# Patient Record
Sex: FEMALE | Race: WHITE
[De-identification: names, ages, dates, MRNs, and addresses within clinical notes are randomized per-mention and may not be internally consistent; named-entity substitution may affect disease eponyms.]

---

## 2022-12-22 PROBLEM — Z00.00 ENCOUNTER FOR PREVENTIVE HEALTH EXAMINATION: Status: ACTIVE | Noted: 2022-12-22

## 2024-08-23 ENCOUNTER — APPOINTMENT (OUTPATIENT)
Age: 79
End: 2024-08-23
Payer: COMMERCIAL

## 2024-08-23 ENCOUNTER — NON-APPOINTMENT (OUTPATIENT)
Age: 79
End: 2024-08-23

## 2024-08-23 PROCEDURE — 92136 OPHTHALMIC BIOMETRY: CPT

## 2024-08-23 PROCEDURE — 92002 INTRM OPH EXAM NEW PATIENT: CPT

## 2024-11-05 ENCOUNTER — NON-APPOINTMENT (OUTPATIENT)
Age: 79
End: 2024-11-05

## 2024-11-05 ENCOUNTER — APPOINTMENT (OUTPATIENT)
Age: 79
End: 2024-11-05

## 2024-11-05 PROCEDURE — 66984 XCAPSL CTRC RMVL W/O ECP: CPT | Mod: LT

## 2024-11-06 ENCOUNTER — NON-APPOINTMENT (OUTPATIENT)
Age: 79
End: 2024-11-06

## 2024-11-06 ENCOUNTER — APPOINTMENT (OUTPATIENT)
Age: 79
End: 2024-11-06
Payer: COMMERCIAL

## 2024-11-06 PROCEDURE — 99024 POSTOP FOLLOW-UP VISIT: CPT

## 2024-11-12 ENCOUNTER — APPOINTMENT (OUTPATIENT)
Age: 79
End: 2024-11-12

## 2024-11-12 ENCOUNTER — APPOINTMENT (OUTPATIENT)
Age: 79
End: 2024-11-12
Payer: COMMERCIAL

## 2024-11-12 ENCOUNTER — NON-APPOINTMENT (OUTPATIENT)
Age: 79
End: 2024-11-12

## 2024-11-12 PROCEDURE — 99024 POSTOP FOLLOW-UP VISIT: CPT

## 2024-11-13 ENCOUNTER — NON-APPOINTMENT (OUTPATIENT)
Age: 79
End: 2024-11-13

## 2024-11-13 ENCOUNTER — APPOINTMENT (OUTPATIENT)
Age: 79
End: 2024-11-13
Payer: COMMERCIAL

## 2024-11-13 PROCEDURE — 99024 POSTOP FOLLOW-UP VISIT: CPT

## 2024-11-14 ENCOUNTER — APPOINTMENT (OUTPATIENT)
Age: 79
End: 2024-11-14
Payer: COMMERCIAL

## 2024-11-14 ENCOUNTER — INPATIENT (INPATIENT)
Facility: HOSPITAL | Age: 79
LOS: 22 days | Discharge: HOME HEALTH | End: 2024-12-07
Attending: HOSPITALIST | Admitting: HOSPITALIST
Payer: COMMERCIAL

## 2024-11-14 ENCOUNTER — APPOINTMENT (OUTPATIENT)
Dept: OPHTHALMOLOGY | Facility: CLINIC | Age: 79
End: 2024-11-14
Payer: COMMERCIAL

## 2024-11-14 ENCOUNTER — APPOINTMENT (OUTPATIENT)
Age: 79
End: 2024-11-14

## 2024-11-14 ENCOUNTER — NON-APPOINTMENT (OUTPATIENT)
Age: 79
End: 2024-11-14

## 2024-11-14 VITALS
HEIGHT: 56 IN | RESPIRATION RATE: 17 BRPM | WEIGHT: 160.06 LBS | DIASTOLIC BLOOD PRESSURE: 82 MMHG | SYSTOLIC BLOOD PRESSURE: 188 MMHG | TEMPERATURE: 99 F | OXYGEN SATURATION: 96 % | HEART RATE: 57 BPM

## 2024-11-14 DIAGNOSIS — Z98.49 CATARACT EXTRACTION STATUS, UNSPECIFIED EYE: Chronic | ICD-10-CM

## 2024-11-14 DIAGNOSIS — H44.002 UNSPECIFIED PURULENT ENDOPHTHALMITIS, LEFT EYE: ICD-10-CM

## 2024-11-14 DIAGNOSIS — T81.40XA INFECTION FOLLOWING A PROCEDURE, UNSPECIFIED, INITIAL ENCOUNTER: ICD-10-CM

## 2024-11-14 DIAGNOSIS — Z29.9 ENCOUNTER FOR PROPHYLACTIC MEASURES, UNSPECIFIED: ICD-10-CM

## 2024-11-14 LAB
ANION GAP SERPL CALC-SCNC: 16 MMOL/L — HIGH (ref 7–14)
APTT BLD: 31.9 SEC — SIGNIFICANT CHANGE UP (ref 24.5–35.6)
BASOPHILS # BLD AUTO: 0.05 K/UL — SIGNIFICANT CHANGE UP (ref 0–0.2)
BASOPHILS NFR BLD AUTO: 0.3 % — SIGNIFICANT CHANGE UP (ref 0–2)
BLD GP AB SCN SERPL QL: NEGATIVE — SIGNIFICANT CHANGE UP
BUN SERPL-MCNC: 21 MG/DL — SIGNIFICANT CHANGE UP (ref 7–23)
CALCIUM SERPL-MCNC: 9.3 MG/DL — SIGNIFICANT CHANGE UP (ref 8.4–10.5)
CHLORIDE SERPL-SCNC: 101 MMOL/L — SIGNIFICANT CHANGE UP (ref 98–107)
CO2 SERPL-SCNC: 23 MMOL/L — SIGNIFICANT CHANGE UP (ref 22–31)
CREAT SERPL-MCNC: 0.81 MG/DL — SIGNIFICANT CHANGE UP (ref 0.5–1.3)
EGFR: 74 ML/MIN/1.73M2 — SIGNIFICANT CHANGE UP
EGFR: 74 ML/MIN/1.73M2 — SIGNIFICANT CHANGE UP
EOSINOPHIL # BLD AUTO: 0 K/UL — SIGNIFICANT CHANGE UP (ref 0–0.5)
EOSINOPHIL NFR BLD AUTO: 0 % — SIGNIFICANT CHANGE UP (ref 0–6)
GLUCOSE SERPL-MCNC: 173 MG/DL — HIGH (ref 70–99)
HCT VFR BLD CALC: 41.9 % — SIGNIFICANT CHANGE UP (ref 34.5–45)
HGB BLD-MCNC: 14 G/DL — SIGNIFICANT CHANGE UP (ref 11.5–15.5)
IANC: 12.62 K/UL — HIGH (ref 1.8–7.4)
IMM GRANULOCYTES NFR BLD AUTO: 0.4 % — SIGNIFICANT CHANGE UP (ref 0–0.9)
INR BLD: 1.1 RATIO — SIGNIFICANT CHANGE UP (ref 0.85–1.16)
LYMPHOCYTES # BLD AUTO: 1.34 K/UL — SIGNIFICANT CHANGE UP (ref 1–3.3)
LYMPHOCYTES # BLD AUTO: 8.9 % — LOW (ref 13–44)
MAGNESIUM SERPL-MCNC: 2.2 MG/DL — SIGNIFICANT CHANGE UP (ref 1.6–2.6)
MCHC RBC-ENTMCNC: 30.3 PG — SIGNIFICANT CHANGE UP (ref 27–34)
MCHC RBC-ENTMCNC: 33.4 G/DL — SIGNIFICANT CHANGE UP (ref 32–36)
MCV RBC AUTO: 90.7 FL — SIGNIFICANT CHANGE UP (ref 80–100)
MONOCYTES # BLD AUTO: 1.04 K/UL — HIGH (ref 0–0.9)
MONOCYTES NFR BLD AUTO: 6.9 % — SIGNIFICANT CHANGE UP (ref 2–14)
NEUTROPHILS # BLD AUTO: 12.62 K/UL — HIGH (ref 1.8–7.4)
NEUTROPHILS NFR BLD AUTO: 83.5 % — HIGH (ref 43–77)
NRBC # BLD AUTO: 0 K/UL — SIGNIFICANT CHANGE UP (ref 0–0)
NRBC # BLD: 0 /100 WBCS — SIGNIFICANT CHANGE UP (ref 0–0)
NRBC # FLD: 0 K/UL — SIGNIFICANT CHANGE UP (ref 0–0)
NRBC BLD-RTO: 0 /100 WBCS — SIGNIFICANT CHANGE UP (ref 0–0)
PHOSPHATE SERPL-MCNC: 2.6 MG/DL — SIGNIFICANT CHANGE UP (ref 2.5–4.5)
PLATELET # BLD AUTO: 240 K/UL — SIGNIFICANT CHANGE UP (ref 150–400)
POTASSIUM SERPL-MCNC: 3.9 MMOL/L — SIGNIFICANT CHANGE UP (ref 3.5–5.3)
POTASSIUM SERPL-SCNC: 3.9 MMOL/L — SIGNIFICANT CHANGE UP (ref 3.5–5.3)
PROTHROM AB SERPL-ACNC: 12.8 SEC — SIGNIFICANT CHANGE UP (ref 9.9–13.4)
RBC # BLD: 4.62 M/UL — SIGNIFICANT CHANGE UP (ref 3.8–5.2)
RBC # FLD: 13.2 % — SIGNIFICANT CHANGE UP (ref 10.3–14.5)
RH IG SCN BLD-IMP: POSITIVE — SIGNIFICANT CHANGE UP
SODIUM SERPL-SCNC: 140 MMOL/L — SIGNIFICANT CHANGE UP (ref 135–145)
WBC # BLD: 15.11 K/UL — HIGH (ref 3.8–10.5)
WBC # FLD AUTO: 15.11 K/UL — HIGH (ref 3.8–10.5)

## 2024-11-14 PROCEDURE — 99024 POSTOP FOLLOW-UP VISIT: CPT

## 2024-11-14 PROCEDURE — 92012 INTRM OPH EXAM EST PATIENT: CPT

## 2024-11-14 PROCEDURE — 99285 EMERGENCY DEPT VISIT HI MDM: CPT

## 2024-11-14 RX ORDER — SODIUM CHLORIDE 9 G/1000ML
1000 INJECTION, SOLUTION INTRAVENOUS
Refills: 0 | Status: DISCONTINUED | OUTPATIENT
Start: 2024-11-14 | End: 2024-12-07

## 2024-11-14 RX ORDER — INSULIN LISPRO 100 U/ML
INJECTION, SOLUTION INTRAVENOUS; SUBCUTANEOUS
Refills: 0 | Status: DISCONTINUED | OUTPATIENT
Start: 2024-11-14 | End: 2024-12-07

## 2024-11-14 RX ORDER — DEXTROSE 50 % IN WATER 50 %
25 SYRINGE (ML) INTRAVENOUS ONCE
Refills: 0 | Status: DISCONTINUED | OUTPATIENT
Start: 2024-11-14 | End: 2024-12-07

## 2024-11-14 RX ORDER — ACETAMINOPHEN 500 MG/5ML
1000 LIQUID (ML) ORAL ONCE
Refills: 0 | Status: COMPLETED | OUTPATIENT
Start: 2024-11-14 | End: 2024-11-14

## 2024-11-14 RX ORDER — DEXTROSE 50 % IN WATER 50 %
15 SYRINGE (ML) INTRAVENOUS ONCE
Refills: 0 | Status: DISCONTINUED | OUTPATIENT
Start: 2024-11-14 | End: 2024-12-07

## 2024-11-14 RX ORDER — VANCOMYCIN HCL IN 5 % DEXTROSE 1.5G/250ML
1 PLASTIC BAG, INJECTION (ML) INTRAVENOUS ONCE
Refills: 0 | Status: DISCONTINUED | OUTPATIENT
Start: 2024-11-14 | End: 2024-11-14

## 2024-11-14 RX ORDER — HEPARIN SODIUM 1000 [USP'U]/ML
5000 INJECTION INTRAVENOUS; SUBCUTANEOUS EVERY 12 HOURS
Refills: 0 | Status: DISCONTINUED | OUTPATIENT
Start: 2024-11-14 | End: 2024-11-18

## 2024-11-14 RX ORDER — VANCOMYCIN HCL IN 5 % DEXTROSE 1.5G/250ML
1 PLASTIC BAG, INJECTION (ML) INTRAVENOUS
Refills: 0 | Status: DISCONTINUED | OUTPATIENT
Start: 2024-11-14 | End: 2024-11-14

## 2024-11-14 RX ORDER — DEXTROSE 50 % IN WATER 50 %
12.5 SYRINGE (ML) INTRAVENOUS ONCE
Refills: 0 | Status: DISCONTINUED | OUTPATIENT
Start: 2024-11-14 | End: 2024-12-07

## 2024-11-14 RX ORDER — VANCOMYCIN HCL IN 5 % DEXTROSE 1.5G/250ML
1 PLASTIC BAG, INJECTION (ML) INTRAVENOUS
Refills: 0 | Status: DISCONTINUED | OUTPATIENT
Start: 2024-11-14 | End: 2024-11-17

## 2024-11-14 RX ORDER — INFLUENZA A VIRUS A/IDAHO/07/2018 (H1N1) ANTIGEN (MDCK CELL DERIVED, PROPIOLACTONE INACTIVATED, INFLUENZA A VIRUS A/INDIANA/08/2018 (H3N2) ANTIGEN (MDCK CELL DERIVED, PROPIOLACTONE INACTIVATED), INFLUENZA B VIRUS B/SINGAPORE/INFTT-16-0610/2016 ANTIGEN (MDCK CELL DERIVED, PROPIOLACTONE INACTIVATED), INFLUENZA B VIRUS B/IOWA/06/2017 ANTIGEN (MDCK CELL DERIVED, PROPIOLACTONE INACTIVATED) 15; 15; 15; 15 UG/.5ML; UG/.5ML; UG/.5ML; UG/.5ML
0.5 INJECTION, SUSPENSION INTRAMUSCULAR ONCE
Refills: 0 | Status: DISCONTINUED | OUTPATIENT
Start: 2024-11-14 | End: 2024-12-07

## 2024-11-14 RX ORDER — GLUCAGON 3 MG/1
1 POWDER NASAL ONCE
Refills: 0 | Status: DISCONTINUED | OUTPATIENT
Start: 2024-11-14 | End: 2024-12-07

## 2024-11-14 RX ORDER — INSULIN LISPRO 100 U/ML
INJECTION, SOLUTION INTRAVENOUS; SUBCUTANEOUS AT BEDTIME
Refills: 0 | Status: DISCONTINUED | OUTPATIENT
Start: 2024-11-14 | End: 2024-12-07

## 2024-11-14 RX ADMIN — Medication 400 MILLIGRAM(S): at 19:37

## 2024-11-14 RX ADMIN — Medication 1 DROP(S): at 23:08

## 2024-11-14 RX ADMIN — Medication 1 DROP(S): at 23:15

## 2024-11-15 DIAGNOSIS — Z98.890 OTHER SPECIFIED POSTPROCEDURAL STATES: Chronic | ICD-10-CM

## 2024-11-15 DIAGNOSIS — Z98.49 CATARACT EXTRACTION STATUS, UNSPECIFIED EYE: Chronic | ICD-10-CM

## 2024-11-15 DIAGNOSIS — Z98.891 HISTORY OF UTERINE SCAR FROM PREVIOUS SURGERY: Chronic | ICD-10-CM

## 2024-11-15 DIAGNOSIS — I10 ESSENTIAL (PRIMARY) HYPERTENSION: ICD-10-CM

## 2024-11-15 DIAGNOSIS — Z79.899 OTHER LONG TERM (CURRENT) DRUG THERAPY: ICD-10-CM

## 2024-11-15 DIAGNOSIS — E11.9 TYPE 2 DIABETES MELLITUS WITHOUT COMPLICATIONS: ICD-10-CM

## 2024-11-15 LAB
A1C WITH ESTIMATED AVERAGE GLUCOSE RESULT: 7.7 % — HIGH (ref 4–5.6)
ALBUMIN SERPL ELPH-MCNC: 3.8 G/DL — SIGNIFICANT CHANGE UP (ref 3.3–5)
ALP SERPL-CCNC: 74 U/L — SIGNIFICANT CHANGE UP (ref 40–120)
ALT FLD-CCNC: 12 U/L — SIGNIFICANT CHANGE UP (ref 4–33)
ANION GAP SERPL CALC-SCNC: 14 MMOL/L — SIGNIFICANT CHANGE UP (ref 7–14)
AST SERPL-CCNC: 13 U/L — SIGNIFICANT CHANGE UP (ref 4–32)
BASOPHILS # BLD AUTO: 0.05 K/UL — SIGNIFICANT CHANGE UP (ref 0–0.2)
BASOPHILS NFR BLD AUTO: 0.4 % — SIGNIFICANT CHANGE UP (ref 0–2)
BILIRUB SERPL-MCNC: 0.8 MG/DL — SIGNIFICANT CHANGE UP (ref 0.2–1.2)
BUN SERPL-MCNC: 21 MG/DL — SIGNIFICANT CHANGE UP (ref 7–23)
CALCIUM SERPL-MCNC: 9.1 MG/DL — SIGNIFICANT CHANGE UP (ref 8.4–10.5)
CHLORIDE SERPL-SCNC: 102 MMOL/L — SIGNIFICANT CHANGE UP (ref 98–107)
CO2 SERPL-SCNC: 21 MMOL/L — LOW (ref 22–31)
CREAT SERPL-MCNC: 0.76 MG/DL — SIGNIFICANT CHANGE UP (ref 0.5–1.3)
EGFR: 80 ML/MIN/1.73M2 — SIGNIFICANT CHANGE UP
EGFR: 80 ML/MIN/1.73M2 — SIGNIFICANT CHANGE UP
EOSINOPHIL # BLD AUTO: 0.01 K/UL — SIGNIFICANT CHANGE UP (ref 0–0.5)
EOSINOPHIL NFR BLD AUTO: 0.1 % — SIGNIFICANT CHANGE UP (ref 0–6)
ESTIMATED AVERAGE GLUCOSE: 174 — SIGNIFICANT CHANGE UP
GLUCOSE BLDC GLUCOMTR-MCNC: 141 MG/DL — HIGH (ref 70–99)
GLUCOSE BLDC GLUCOMTR-MCNC: 160 MG/DL — HIGH (ref 70–99)
GLUCOSE BLDC GLUCOMTR-MCNC: 181 MG/DL — HIGH (ref 70–99)
GLUCOSE SERPL-MCNC: 177 MG/DL — HIGH (ref 70–99)
HCT VFR BLD CALC: 39.9 % — SIGNIFICANT CHANGE UP (ref 34.5–45)
HGB BLD-MCNC: 13.2 G/DL — SIGNIFICANT CHANGE UP (ref 11.5–15.5)
IANC: 10.7 K/UL — HIGH (ref 1.8–7.4)
IMM GRANULOCYTES NFR BLD AUTO: 1.1 % — HIGH (ref 0–0.9)
LYMPHOCYTES # BLD AUTO: 1.39 K/UL — SIGNIFICANT CHANGE UP (ref 1–3.3)
LYMPHOCYTES # BLD AUTO: 10.4 % — LOW (ref 13–44)
MAGNESIUM SERPL-MCNC: 2.1 MG/DL — SIGNIFICANT CHANGE UP (ref 1.6–2.6)
MCHC RBC-ENTMCNC: 30.3 PG — SIGNIFICANT CHANGE UP (ref 27–34)
MCHC RBC-ENTMCNC: 33.1 G/DL — SIGNIFICANT CHANGE UP (ref 32–36)
MCV RBC AUTO: 91.7 FL — SIGNIFICANT CHANGE UP (ref 80–100)
MONOCYTES # BLD AUTO: 1.09 K/UL — HIGH (ref 0–0.9)
MONOCYTES NFR BLD AUTO: 8.1 % — SIGNIFICANT CHANGE UP (ref 2–14)
NEUTROPHILS # BLD AUTO: 10.7 K/UL — HIGH (ref 1.8–7.4)
NEUTROPHILS NFR BLD AUTO: 79.9 % — HIGH (ref 43–77)
NRBC # BLD AUTO: 0 K/UL — SIGNIFICANT CHANGE UP (ref 0–0)
NRBC # BLD: 0 /100 WBCS — SIGNIFICANT CHANGE UP (ref 0–0)
NRBC # FLD: 0 K/UL — SIGNIFICANT CHANGE UP (ref 0–0)
NRBC BLD-RTO: 0 /100 WBCS — SIGNIFICANT CHANGE UP (ref 0–0)
PHOSPHATE SERPL-MCNC: 2.9 MG/DL — SIGNIFICANT CHANGE UP (ref 2.5–4.5)
PLATELET # BLD AUTO: 209 K/UL — SIGNIFICANT CHANGE UP (ref 150–400)
POTASSIUM SERPL-MCNC: 3.7 MMOL/L — SIGNIFICANT CHANGE UP (ref 3.5–5.3)
POTASSIUM SERPL-SCNC: 3.7 MMOL/L — SIGNIFICANT CHANGE UP (ref 3.5–5.3)
PROT SERPL-MCNC: 6.8 G/DL — SIGNIFICANT CHANGE UP (ref 6–8.3)
RBC # BLD: 4.35 M/UL — SIGNIFICANT CHANGE UP (ref 3.8–5.2)
RBC # FLD: 13.2 % — SIGNIFICANT CHANGE UP (ref 10.3–14.5)
SODIUM SERPL-SCNC: 137 MMOL/L — SIGNIFICANT CHANGE UP (ref 135–145)
WBC # BLD: 13.39 K/UL — HIGH (ref 3.8–10.5)
WBC # FLD AUTO: 13.39 K/UL — HIGH (ref 3.8–10.5)

## 2024-11-15 RX ORDER — DOXYCYCLINE HYCLATE 100 MG
100 TABLET ORAL EVERY 12 HOURS
Refills: 0 | Status: DISCONTINUED | OUTPATIENT
Start: 2024-11-15 | End: 2024-12-07

## 2024-11-15 RX ORDER — LISINOPRIL 5 MG/1
20 TABLET ORAL DAILY
Refills: 0 | Status: DISCONTINUED | OUTPATIENT
Start: 2024-11-15 | End: 2024-11-23

## 2024-11-15 RX ADMIN — Medication 1 DROP(S): at 10:58

## 2024-11-15 RX ADMIN — Medication 1 DROP(S): at 00:08

## 2024-11-15 RX ADMIN — Medication 1 DROP(S): at 04:55

## 2024-11-15 RX ADMIN — Medication 1 DROP(S): at 06:57

## 2024-11-15 RX ADMIN — Medication 1 DROP(S): at 14:53

## 2024-11-15 RX ADMIN — Medication 1 DROP(S): at 00:22

## 2024-11-15 RX ADMIN — Medication 1 DROP(S): at 17:58

## 2024-11-15 RX ADMIN — Medication 1 DROP(S): at 14:57

## 2024-11-15 RX ADMIN — Medication 1 DROP(S): at 19:53

## 2024-11-15 RX ADMIN — Medication 1 DROP(S): at 08:53

## 2024-11-15 RX ADMIN — Medication 1 DROP(S): at 02:15

## 2024-11-15 RX ADMIN — Medication 1 DROP(S): at 07:56

## 2024-11-15 RX ADMIN — Medication 1 DROP(S): at 17:53

## 2024-11-15 RX ADMIN — Medication 1 DROP(S): at 20:47

## 2024-11-15 RX ADMIN — LISINOPRIL 20 MILLIGRAM(S): 5 TABLET ORAL at 06:06

## 2024-11-15 RX ADMIN — Medication 1 DROP(S): at 12:58

## 2024-11-15 RX ADMIN — Medication 1 DROP(S): at 11:58

## 2024-11-15 RX ADMIN — Medication 1 DROP(S): at 09:53

## 2024-11-15 RX ADMIN — Medication 1 DROP(S): at 22:51

## 2024-11-15 RX ADMIN — Medication 1 DROP(S): at 05:58

## 2024-11-15 RX ADMIN — Medication 1 DROP(S): at 12:52

## 2024-11-15 RX ADMIN — Medication 100 MILLIGRAM(S): at 17:49

## 2024-11-15 RX ADMIN — Medication 1 DROP(S): at 23:43

## 2024-11-15 RX ADMIN — Medication 1 DROP(S): at 05:55

## 2024-11-15 RX ADMIN — Medication 1 DROP(S): at 03:53

## 2024-11-15 RX ADMIN — Medication 1 DROP(S): at 06:54

## 2024-11-15 RX ADMIN — Medication 1 DROP(S): at 08:49

## 2024-11-15 RX ADMIN — Medication 1 DROP(S): at 18:57

## 2024-11-15 RX ADMIN — INSULIN LISPRO 1: 100 INJECTION, SOLUTION INTRAVENOUS; SUBCUTANEOUS at 12:29

## 2024-11-15 RX ADMIN — INSULIN LISPRO 1: 100 INJECTION, SOLUTION INTRAVENOUS; SUBCUTANEOUS at 08:50

## 2024-11-15 RX ADMIN — HEPARIN SODIUM 5000 UNIT(S): 1000 INJECTION INTRAVENOUS; SUBCUTANEOUS at 17:48

## 2024-11-15 RX ADMIN — Medication 1 DROP(S): at 02:08

## 2024-11-15 RX ADMIN — Medication 1 DROP(S): at 21:46

## 2024-11-15 RX ADMIN — HEPARIN SODIUM 5000 UNIT(S): 1000 INJECTION INTRAVENOUS; SUBCUTANEOUS at 05:55

## 2024-11-15 RX ADMIN — Medication 1 DROP(S): at 11:53

## 2024-11-15 RX ADMIN — Medication 1 DROP(S): at 16:53

## 2024-11-15 RX ADMIN — Medication 1 DROP(S): at 03:58

## 2024-11-15 RX ADMIN — Medication 1 DROP(S): at 13:57

## 2024-11-15 RX ADMIN — Medication 1 DROP(S): at 20:42

## 2024-11-15 RX ADMIN — Medication 1 DROP(S): at 19:56

## 2024-11-15 RX ADMIN — Medication 1 DROP(S): at 23:02

## 2024-11-15 RX ADMIN — Medication 1 DROP(S): at 16:57

## 2024-11-15 RX ADMIN — Medication 1 DROP(S): at 18:53

## 2024-11-15 RX ADMIN — Medication 1 DROP(S): at 21:40

## 2024-11-15 RX ADMIN — Medication 1 DROP(S): at 14:45

## 2024-11-15 RX ADMIN — Medication 1 DROP(S): at 15:53

## 2024-11-15 RX ADMIN — Medication 1 DROP(S): at 10:52

## 2024-11-15 RX ADMIN — Medication 1000 MILLIGRAM(S): at 13:53

## 2024-11-15 RX ADMIN — Medication 1 DROP(S): at 07:54

## 2024-11-15 RX ADMIN — Medication 1 DROP(S): at 19:03

## 2024-11-15 RX ADMIN — Medication 1 DROP(S): at 04:51

## 2024-11-15 RX ADMIN — Medication 1 DROP(S): at 01:24

## 2024-11-15 RX ADMIN — Medication 1 DROP(S): at 15:57

## 2024-11-15 RX ADMIN — Medication 1 DROP(S): at 13:53

## 2024-11-15 RX ADMIN — Medication 1 DROP(S): at 23:53

## 2024-11-15 RX ADMIN — Medication 1 DROP(S): at 01:35

## 2024-11-16 LAB
ANION GAP SERPL CALC-SCNC: 14 MMOL/L — SIGNIFICANT CHANGE UP (ref 7–14)
BUN SERPL-MCNC: 22 MG/DL — SIGNIFICANT CHANGE UP (ref 7–23)
CALCIUM SERPL-MCNC: 9.1 MG/DL — SIGNIFICANT CHANGE UP (ref 8.4–10.5)
CHLORIDE SERPL-SCNC: 102 MMOL/L — SIGNIFICANT CHANGE UP (ref 98–107)
CO2 SERPL-SCNC: 23 MMOL/L — SIGNIFICANT CHANGE UP (ref 22–31)
CREAT SERPL-MCNC: 0.79 MG/DL — SIGNIFICANT CHANGE UP (ref 0.5–1.3)
EGFR: 77 ML/MIN/1.73M2 — SIGNIFICANT CHANGE UP
EGFR: 77 ML/MIN/1.73M2 — SIGNIFICANT CHANGE UP
GLUCOSE BLDC GLUCOMTR-MCNC: 129 MG/DL — HIGH (ref 70–99)
GLUCOSE BLDC GLUCOMTR-MCNC: 147 MG/DL — HIGH (ref 70–99)
GLUCOSE BLDC GLUCOMTR-MCNC: 156 MG/DL — HIGH (ref 70–99)
GLUCOSE BLDC GLUCOMTR-MCNC: 160 MG/DL — HIGH (ref 70–99)
GLUCOSE SERPL-MCNC: 141 MG/DL — HIGH (ref 70–99)
HCT VFR BLD CALC: 40 % — SIGNIFICANT CHANGE UP (ref 34.5–45)
HGB BLD-MCNC: 12.9 G/DL — SIGNIFICANT CHANGE UP (ref 11.5–15.5)
MAGNESIUM SERPL-MCNC: 2.1 MG/DL — SIGNIFICANT CHANGE UP (ref 1.6–2.6)
MCHC RBC-ENTMCNC: 29.8 PG — SIGNIFICANT CHANGE UP (ref 27–34)
MCHC RBC-ENTMCNC: 32.3 G/DL — SIGNIFICANT CHANGE UP (ref 32–36)
MCV RBC AUTO: 92.4 FL — SIGNIFICANT CHANGE UP (ref 80–100)
MRSA PCR RESULT.: SIGNIFICANT CHANGE UP
NRBC # BLD AUTO: 0 K/UL — SIGNIFICANT CHANGE UP (ref 0–0)
NRBC # BLD: 0 /100 WBCS — SIGNIFICANT CHANGE UP (ref 0–0)
NRBC # FLD: 0 K/UL — SIGNIFICANT CHANGE UP (ref 0–0)
NRBC BLD-RTO: 0 /100 WBCS — SIGNIFICANT CHANGE UP (ref 0–0)
PHOSPHATE SERPL-MCNC: 2.9 MG/DL — SIGNIFICANT CHANGE UP (ref 2.5–4.5)
PLATELET # BLD AUTO: 213 K/UL — SIGNIFICANT CHANGE UP (ref 150–400)
POTASSIUM SERPL-MCNC: 3.5 MMOL/L — SIGNIFICANT CHANGE UP (ref 3.5–5.3)
POTASSIUM SERPL-SCNC: 3.5 MMOL/L — SIGNIFICANT CHANGE UP (ref 3.5–5.3)
RBC # BLD: 4.33 M/UL — SIGNIFICANT CHANGE UP (ref 3.8–5.2)
RBC # FLD: 13.2 % — SIGNIFICANT CHANGE UP (ref 10.3–14.5)
S AUREUS DNA NOSE QL NAA+PROBE: SIGNIFICANT CHANGE UP
SODIUM SERPL-SCNC: 139 MMOL/L — SIGNIFICANT CHANGE UP (ref 135–145)
WBC # BLD: 10.6 K/UL — HIGH (ref 3.8–10.5)
WBC # FLD AUTO: 10.6 K/UL — HIGH (ref 3.8–10.5)

## 2024-11-16 RX ORDER — POLYETHYLENE GLYCOL 3350 17 G/17G
17 POWDER, FOR SOLUTION ORAL DAILY
Refills: 0 | Status: DISCONTINUED | OUTPATIENT
Start: 2024-11-16 | End: 2024-12-07

## 2024-11-16 RX ORDER — SENNA 187 MG
2 TABLET ORAL AT BEDTIME
Refills: 0 | Status: DISCONTINUED | OUTPATIENT
Start: 2024-11-16 | End: 2024-12-07

## 2024-11-16 RX ADMIN — Medication 1 DROP(S): at 10:44

## 2024-11-16 RX ADMIN — Medication 1 DROP(S): at 18:50

## 2024-11-16 RX ADMIN — LISINOPRIL 20 MILLIGRAM(S): 5 TABLET ORAL at 06:19

## 2024-11-16 RX ADMIN — Medication 1 DROP(S): at 19:50

## 2024-11-16 RX ADMIN — Medication 1 DROP(S): at 23:47

## 2024-11-16 RX ADMIN — Medication 1 DROP(S): at 01:41

## 2024-11-16 RX ADMIN — Medication 1 DROP(S): at 07:42

## 2024-11-16 RX ADMIN — Medication 1 APPLICATION(S): at 11:44

## 2024-11-16 RX ADMIN — Medication 1 DROP(S): at 03:54

## 2024-11-16 RX ADMIN — Medication 1 DROP(S): at 03:59

## 2024-11-16 RX ADMIN — Medication 1 DROP(S): at 14:50

## 2024-11-16 RX ADMIN — Medication 1 DROP(S): at 05:41

## 2024-11-16 RX ADMIN — Medication 1 DROP(S): at 10:49

## 2024-11-16 RX ADMIN — Medication 1 DROP(S): at 13:44

## 2024-11-16 RX ADMIN — Medication 1 DROP(S): at 14:45

## 2024-11-16 RX ADMIN — Medication 1 DROP(S): at 17:44

## 2024-11-16 RX ADMIN — Medication 1 DROP(S): at 21:23

## 2024-11-16 RX ADMIN — Medication 1 DROP(S): at 09:49

## 2024-11-16 RX ADMIN — Medication 40 MILLIEQUIVALENT(S): at 11:43

## 2024-11-16 RX ADMIN — Medication 1 DROP(S): at 11:44

## 2024-11-16 RX ADMIN — Medication 1 DROP(S): at 21:42

## 2024-11-16 RX ADMIN — Medication 1 DROP(S): at 06:19

## 2024-11-16 RX ADMIN — Medication 1 DROP(S): at 00:42

## 2024-11-16 RX ADMIN — Medication 1 DROP(S): at 11:43

## 2024-11-16 RX ADMIN — Medication 1 DROP(S): at 16:49

## 2024-11-16 RX ADMIN — HEPARIN SODIUM 5000 UNIT(S): 1000 INJECTION INTRAVENOUS; SUBCUTANEOUS at 06:20

## 2024-11-16 RX ADMIN — Medication 1 DROP(S): at 02:43

## 2024-11-16 RX ADMIN — Medication 1 DROP(S): at 08:49

## 2024-11-16 RX ADMIN — Medication 1 DROP(S): at 05:00

## 2024-11-16 RX ADMIN — Medication 1 DROP(S): at 17:49

## 2024-11-16 RX ADMIN — Medication 2 TABLET(S): at 22:02

## 2024-11-16 RX ADMIN — Medication 1 DROP(S): at 22:02

## 2024-11-16 RX ADMIN — Medication 1 DROP(S): at 09:44

## 2024-11-16 RX ADMIN — Medication 1 DROP(S): at 23:03

## 2024-11-16 RX ADMIN — Medication 1 DROP(S): at 04:55

## 2024-11-16 RX ADMIN — Medication 1 DROP(S): at 15:49

## 2024-11-16 RX ADMIN — Medication 1 DROP(S): at 16:43

## 2024-11-16 RX ADMIN — Medication 1 DROP(S): at 18:44

## 2024-11-16 RX ADMIN — Medication 1 DROP(S): at 13:49

## 2024-11-16 RX ADMIN — Medication 1 DROP(S): at 01:47

## 2024-11-16 RX ADMIN — INSULIN LISPRO 1: 100 INJECTION, SOLUTION INTRAVENOUS; SUBCUTANEOUS at 08:48

## 2024-11-16 RX ADMIN — Medication 100 MILLIGRAM(S): at 17:45

## 2024-11-16 RX ADMIN — Medication 1 DROP(S): at 05:59

## 2024-11-16 RX ADMIN — Medication 1 DROP(S): at 00:48

## 2024-11-16 RX ADMIN — Medication 1 DROP(S): at 23:59

## 2024-11-16 RX ADMIN — INSULIN LISPRO 1: 100 INJECTION, SOLUTION INTRAVENOUS; SUBCUTANEOUS at 12:25

## 2024-11-16 RX ADMIN — Medication 1 DROP(S): at 23:16

## 2024-11-16 RX ADMIN — Medication 1 DROP(S): at 19:44

## 2024-11-16 RX ADMIN — Medication 100 MILLIGRAM(S): at 06:19

## 2024-11-16 RX ADMIN — Medication 1 DROP(S): at 12:50

## 2024-11-16 RX ADMIN — Medication 1000 MILLIGRAM(S): at 11:44

## 2024-11-16 RX ADMIN — Medication 1 DROP(S): at 02:48

## 2024-11-16 RX ADMIN — Medication 1 DROP(S): at 12:44

## 2024-11-16 RX ADMIN — Medication 1 DROP(S): at 08:44

## 2024-11-16 RX ADMIN — Medication 1 DROP(S): at 06:40

## 2024-11-16 RX ADMIN — Medication 1 DROP(S): at 07:10

## 2024-11-16 RX ADMIN — Medication 1 DROP(S): at 21:14

## 2024-11-16 RX ADMIN — Medication 1 DROP(S): at 00:54

## 2024-11-16 RX ADMIN — Medication 1 DROP(S): at 11:50

## 2024-11-16 RX ADMIN — Medication 1 DROP(S): at 15:45

## 2024-11-17 LAB
GLUCOSE BLDC GLUCOMTR-MCNC: 148 MG/DL — HIGH (ref 70–99)
GLUCOSE BLDC GLUCOMTR-MCNC: 156 MG/DL — HIGH (ref 70–99)
GLUCOSE BLDC GLUCOMTR-MCNC: 172 MG/DL — HIGH (ref 70–99)
GLUCOSE BLDC GLUCOMTR-MCNC: 183 MG/DL — HIGH (ref 70–99)

## 2024-11-17 PROCEDURE — 99223 1ST HOSP IP/OBS HIGH 75: CPT

## 2024-11-17 RX ORDER — MOXIFLOXACIN HYDROCHLORIDE 400 MG/1
400 TABLET, FILM COATED ORAL EVERY 24 HOURS
Refills: 0 | Status: COMPLETED | OUTPATIENT
Start: 2024-11-17 | End: 2024-11-30

## 2024-11-17 RX ORDER — CEFTRIAXONE 500 MG/1
2000 INJECTION, POWDER, FOR SOLUTION INTRAMUSCULAR; INTRAVENOUS EVERY 12 HOURS
Refills: 0 | Status: DISCONTINUED | OUTPATIENT
Start: 2024-11-17 | End: 2024-11-17

## 2024-11-17 RX ORDER — VANCOMYCIN HCL IN 5 % DEXTROSE 1.5G/250ML
1 PLASTIC BAG, INJECTION (ML) INTRAVENOUS
Refills: 0 | Status: DISCONTINUED | OUTPATIENT
Start: 2024-11-17 | End: 2024-11-19

## 2024-11-17 RX ORDER — CEFTRIAXONE 500 MG/1
2000 INJECTION, POWDER, FOR SOLUTION INTRAMUSCULAR; INTRAVENOUS EVERY 12 HOURS
Refills: 0 | Status: COMPLETED | OUTPATIENT
Start: 2024-11-17 | End: 2024-11-24

## 2024-11-17 RX ADMIN — CEFTRIAXONE 100 MILLIGRAM(S): 500 INJECTION, POWDER, FOR SOLUTION INTRAMUSCULAR; INTRAVENOUS at 18:19

## 2024-11-17 RX ADMIN — Medication 1 DROP(S): at 02:42

## 2024-11-17 RX ADMIN — Medication 1 DROP(S): at 11:50

## 2024-11-17 RX ADMIN — Medication 1 DROP(S): at 03:45

## 2024-11-17 RX ADMIN — Medication 1 DROP(S): at 17:55

## 2024-11-17 RX ADMIN — Medication 1 DROP(S): at 06:19

## 2024-11-17 RX ADMIN — Medication 1 DROP(S): at 18:50

## 2024-11-17 RX ADMIN — Medication 1 DROP(S): at 06:13

## 2024-11-17 RX ADMIN — Medication 1 DROP(S): at 19:53

## 2024-11-17 RX ADMIN — Medication 1 DROP(S): at 12:58

## 2024-11-17 RX ADMIN — Medication 1 DROP(S): at 04:58

## 2024-11-17 RX ADMIN — Medication 1 DROP(S): at 23:57

## 2024-11-17 RX ADMIN — Medication 1 DROP(S): at 09:50

## 2024-11-17 RX ADMIN — Medication 1 APPLICATION(S): at 18:20

## 2024-11-17 RX ADMIN — Medication 100 MILLIGRAM(S): at 06:59

## 2024-11-17 RX ADMIN — Medication 1 DROP(S): at 15:51

## 2024-11-17 RX ADMIN — Medication 1 DROP(S): at 22:48

## 2024-11-17 RX ADMIN — Medication 1 DROP(S): at 11:55

## 2024-11-17 RX ADMIN — Medication 1 DROP(S): at 00:52

## 2024-11-17 RX ADMIN — Medication 1 DROP(S): at 10:00

## 2024-11-17 RX ADMIN — Medication 1 DROP(S): at 06:53

## 2024-11-17 RX ADMIN — Medication 1 DROP(S): at 06:59

## 2024-11-17 RX ADMIN — Medication 1 DROP(S): at 07:58

## 2024-11-17 RX ADMIN — Medication 1 DROP(S): at 02:41

## 2024-11-17 RX ADMIN — INSULIN LISPRO 1: 100 INJECTION, SOLUTION INTRAVENOUS; SUBCUTANEOUS at 13:07

## 2024-11-17 RX ADMIN — MOXIFLOXACIN HYDROCHLORIDE 250 MILLIGRAM(S): 400 TABLET, FILM COATED ORAL at 19:34

## 2024-11-17 RX ADMIN — Medication 1 DROP(S): at 10:56

## 2024-11-17 RX ADMIN — Medication 1 DROP(S): at 08:56

## 2024-11-17 RX ADMIN — Medication 1 DROP(S): at 07:50

## 2024-11-17 RX ADMIN — Medication 1 DROP(S): at 16:54

## 2024-11-17 RX ADMIN — Medication 1 DROP(S): at 20:55

## 2024-11-17 RX ADMIN — Medication 1 DROP(S): at 12:50

## 2024-11-17 RX ADMIN — Medication 1 DROP(S): at 02:10

## 2024-11-17 RX ADMIN — INSULIN LISPRO 1: 100 INJECTION, SOLUTION INTRAVENOUS; SUBCUTANEOUS at 18:19

## 2024-11-17 RX ADMIN — Medication 1 DROP(S): at 00:58

## 2024-11-17 RX ADMIN — Medication 1 DROP(S): at 03:46

## 2024-11-17 RX ADMIN — Medication 1 DROP(S): at 10:50

## 2024-11-17 RX ADMIN — Medication 100 MILLIGRAM(S): at 18:19

## 2024-11-17 RX ADMIN — Medication 1 DROP(S): at 04:51

## 2024-11-17 RX ADMIN — Medication 1000 MILLIGRAM(S): at 11:50

## 2024-11-17 RX ADMIN — Medication 1 DROP(S): at 21:40

## 2024-11-17 RX ADMIN — Medication 1 DROP(S): at 08:50

## 2024-11-17 RX ADMIN — Medication 1 DROP(S): at 02:00

## 2024-11-17 RX ADMIN — LISINOPRIL 20 MILLIGRAM(S): 5 TABLET ORAL at 07:36

## 2024-11-18 LAB
ANION GAP SERPL CALC-SCNC: 17 MMOL/L — HIGH (ref 7–14)
BUN SERPL-MCNC: 23 MG/DL — SIGNIFICANT CHANGE UP (ref 7–23)
CALCIUM SERPL-MCNC: 9.2 MG/DL — SIGNIFICANT CHANGE UP (ref 8.4–10.5)
CHLORIDE SERPL-SCNC: 100 MMOL/L — SIGNIFICANT CHANGE UP (ref 98–107)
CO2 SERPL-SCNC: 19 MMOL/L — LOW (ref 22–31)
CREAT SERPL-MCNC: 0.86 MG/DL — SIGNIFICANT CHANGE UP (ref 0.5–1.3)
EGFR: 69 ML/MIN/1.73M2 — SIGNIFICANT CHANGE UP
EGFR: 69 ML/MIN/1.73M2 — SIGNIFICANT CHANGE UP
GLUCOSE BLDC GLUCOMTR-MCNC: 160 MG/DL — HIGH (ref 70–99)
GLUCOSE SERPL-MCNC: 219 MG/DL — HIGH (ref 70–99)
HCT VFR BLD CALC: 42.1 % — SIGNIFICANT CHANGE UP (ref 34.5–45)
HGB BLD-MCNC: 14.1 G/DL — SIGNIFICANT CHANGE UP (ref 11.5–15.5)
MAGNESIUM SERPL-MCNC: 2 MG/DL — SIGNIFICANT CHANGE UP (ref 1.6–2.6)
MCHC RBC-ENTMCNC: 30.4 PG — SIGNIFICANT CHANGE UP (ref 27–34)
MCHC RBC-ENTMCNC: 33.5 G/DL — SIGNIFICANT CHANGE UP (ref 32–36)
MCV RBC AUTO: 90.7 FL — SIGNIFICANT CHANGE UP (ref 80–100)
NRBC # BLD AUTO: 0 K/UL — SIGNIFICANT CHANGE UP (ref 0–0)
NRBC # BLD: 0 /100 WBCS — SIGNIFICANT CHANGE UP (ref 0–0)
NRBC # FLD: 0 K/UL — SIGNIFICANT CHANGE UP (ref 0–0)
NRBC BLD-RTO: 0 /100 WBCS — SIGNIFICANT CHANGE UP (ref 0–0)
PHOSPHATE SERPL-MCNC: 3.3 MG/DL — SIGNIFICANT CHANGE UP (ref 2.5–4.5)
PLATELET # BLD AUTO: 239 K/UL — SIGNIFICANT CHANGE UP (ref 150–400)
POTASSIUM SERPL-MCNC: 4 MMOL/L — SIGNIFICANT CHANGE UP (ref 3.5–5.3)
POTASSIUM SERPL-SCNC: 4 MMOL/L — SIGNIFICANT CHANGE UP (ref 3.5–5.3)
RBC # BLD: 4.64 M/UL — SIGNIFICANT CHANGE UP (ref 3.8–5.2)
RBC # FLD: 13.2 % — SIGNIFICANT CHANGE UP (ref 10.3–14.5)
SODIUM SERPL-SCNC: 136 MMOL/L — SIGNIFICANT CHANGE UP (ref 135–145)
WBC # BLD: 11.35 K/UL — HIGH (ref 3.8–10.5)
WBC # FLD AUTO: 11.35 K/UL — HIGH (ref 3.8–10.5)

## 2024-11-18 PROCEDURE — 99232 SBSQ HOSP IP/OBS MODERATE 35: CPT

## 2024-11-18 PROCEDURE — 99232 SBSQ HOSP IP/OBS MODERATE 35: CPT | Mod: GC

## 2024-11-18 RX ORDER — HEPARIN SODIUM 1000 [USP'U]/ML
5000 INJECTION INTRAVENOUS; SUBCUTANEOUS EVERY 8 HOURS
Refills: 0 | Status: DISCONTINUED | OUTPATIENT
Start: 2024-11-18 | End: 2024-12-07

## 2024-11-18 RX ORDER — VANCOMYCIN HCL IN 5 % DEXTROSE 1.5G/250ML
0.1 PLASTIC BAG, INJECTION (ML) INTRAVENOUS ONCE
Refills: 0 | Status: DISCONTINUED | OUTPATIENT
Start: 2024-11-18 | End: 2024-11-19

## 2024-11-18 RX ORDER — DEXAMETHASONE 0.5 MG/1
0.1 TABLET ORAL ONCE
Refills: 0 | Status: DISCONTINUED | OUTPATIENT
Start: 2024-11-18 | End: 2024-11-19

## 2024-11-18 RX ADMIN — Medication 1 DROP(S): at 18:05

## 2024-11-18 RX ADMIN — Medication 1 DROP(S): at 11:02

## 2024-11-18 RX ADMIN — INSULIN LISPRO 1: 100 INJECTION, SOLUTION INTRAVENOUS; SUBCUTANEOUS at 12:10

## 2024-11-18 RX ADMIN — Medication 1 DROP(S): at 07:51

## 2024-11-18 RX ADMIN — Medication 1 DROP(S): at 06:53

## 2024-11-18 RX ADMIN — HEPARIN SODIUM 5000 UNIT(S): 1000 INJECTION INTRAVENOUS; SUBCUTANEOUS at 05:55

## 2024-11-18 RX ADMIN — Medication 1 DROP(S): at 12:51

## 2024-11-18 RX ADMIN — Medication 1 DROP(S): at 14:01

## 2024-11-18 RX ADMIN — Medication 1 DROP(S): at 11:59

## 2024-11-18 RX ADMIN — LISINOPRIL 20 MILLIGRAM(S): 5 TABLET ORAL at 05:56

## 2024-11-18 RX ADMIN — MOXIFLOXACIN HYDROCHLORIDE 250 MILLIGRAM(S): 400 TABLET, FILM COATED ORAL at 21:20

## 2024-11-18 RX ADMIN — INSULIN LISPRO 1: 100 INJECTION, SOLUTION INTRAVENOUS; SUBCUTANEOUS at 08:50

## 2024-11-18 RX ADMIN — Medication 1 APPLICATION(S): at 12:00

## 2024-11-18 RX ADMIN — Medication 1000 MILLIGRAM(S): at 12:02

## 2024-11-18 RX ADMIN — Medication 1 DROP(S): at 16:02

## 2024-11-18 RX ADMIN — Medication 1 DROP(S): at 08:50

## 2024-11-18 RX ADMIN — Medication 1 DROP(S): at 19:55

## 2024-11-18 RX ADMIN — Medication 1 DROP(S): at 05:55

## 2024-11-18 RX ADMIN — Medication 1 DROP(S): at 02:46

## 2024-11-18 RX ADMIN — Medication 100 MILLIGRAM(S): at 18:33

## 2024-11-18 RX ADMIN — Medication 100 MILLIGRAM(S): at 05:55

## 2024-11-18 RX ADMIN — Medication 1 DROP(S): at 00:54

## 2024-11-18 RX ADMIN — Medication 1 DROP(S): at 04:57

## 2024-11-18 RX ADMIN — CEFTRIAXONE 100 MILLIGRAM(S): 500 INJECTION, POWDER, FOR SOLUTION INTRAMUSCULAR; INTRAVENOUS at 18:32

## 2024-11-18 RX ADMIN — Medication 1 DROP(S): at 03:57

## 2024-11-18 RX ADMIN — Medication 1 DROP(S): at 01:58

## 2024-11-18 RX ADMIN — Medication 1 DROP(S): at 16:51

## 2024-11-18 RX ADMIN — CEFTRIAXONE 100 MILLIGRAM(S): 500 INJECTION, POWDER, FOR SOLUTION INTRAMUSCULAR; INTRAVENOUS at 05:56

## 2024-11-18 RX ADMIN — Medication 1 DROP(S): at 14:53

## 2024-11-18 RX ADMIN — Medication 1 DROP(S): at 09:56

## 2024-11-19 DIAGNOSIS — Z01.818 ENCOUNTER FOR OTHER PREPROCEDURAL EXAMINATION: ICD-10-CM

## 2024-11-19 PROCEDURE — 93010 ELECTROCARDIOGRAM REPORT: CPT

## 2024-11-19 PROCEDURE — 99233 SBSQ HOSP IP/OBS HIGH 50: CPT | Mod: GC

## 2024-11-19 RX ORDER — VANCOMYCIN HCL IN 5 % DEXTROSE 1.5G/250ML
1 PLASTIC BAG, INJECTION (ML) INTRAVENOUS
Refills: 0 | Status: DISCONTINUED | OUTPATIENT
Start: 2024-11-19 | End: 2024-11-26

## 2024-11-19 RX ADMIN — MOXIFLOXACIN HYDROCHLORIDE 250 MILLIGRAM(S): 400 TABLET, FILM COATED ORAL at 19:00

## 2024-11-19 RX ADMIN — Medication 1000 MILLIGRAM(S): at 12:02

## 2024-11-19 RX ADMIN — Medication 1 DROP(S): at 19:00

## 2024-11-19 RX ADMIN — Medication 1 DROP(S): at 21:12

## 2024-11-19 RX ADMIN — Medication 1 DROP(S): at 16:04

## 2024-11-19 RX ADMIN — CEFTRIAXONE 100 MILLIGRAM(S): 500 INJECTION, POWDER, FOR SOLUTION INTRAMUSCULAR; INTRAVENOUS at 17:14

## 2024-11-19 RX ADMIN — HEPARIN SODIUM 5000 UNIT(S): 1000 INJECTION INTRAVENOUS; SUBCUTANEOUS at 22:15

## 2024-11-19 RX ADMIN — POLYETHYLENE GLYCOL 3350 17 GRAM(S): 17 POWDER, FOR SOLUTION ORAL at 12:09

## 2024-11-19 RX ADMIN — CEFTRIAXONE 100 MILLIGRAM(S): 500 INJECTION, POWDER, FOR SOLUTION INTRAMUSCULAR; INTRAVENOUS at 05:35

## 2024-11-19 RX ADMIN — Medication 2 TABLET(S): at 22:14

## 2024-11-19 RX ADMIN — Medication 1 DROP(S): at 18:59

## 2024-11-19 RX ADMIN — Medication 1 DROP(S): at 14:51

## 2024-11-19 RX ADMIN — Medication 100 MILLIGRAM(S): at 05:34

## 2024-11-19 RX ADMIN — Medication 1 DROP(S): at 20:17

## 2024-11-19 RX ADMIN — Medication 1 DROP(S): at 17:14

## 2024-11-19 RX ADMIN — Medication 1 DROP(S): at 22:13

## 2024-11-19 RX ADMIN — LISINOPRIL 20 MILLIGRAM(S): 5 TABLET ORAL at 05:34

## 2024-11-19 RX ADMIN — Medication 100 MILLIGRAM(S): at 17:14

## 2024-11-19 RX ADMIN — Medication 1 APPLICATION(S): at 12:07

## 2024-11-20 LAB
ANION GAP SERPL CALC-SCNC: 15 MMOL/L — HIGH (ref 7–14)
BUN SERPL-MCNC: 28 MG/DL — HIGH (ref 7–23)
CALCIUM SERPL-MCNC: 9 MG/DL — SIGNIFICANT CHANGE UP (ref 8.4–10.5)
CHLORIDE SERPL-SCNC: 100 MMOL/L — SIGNIFICANT CHANGE UP (ref 98–107)
CO2 SERPL-SCNC: 22 MMOL/L — SIGNIFICANT CHANGE UP (ref 22–31)
CREAT SERPL-MCNC: 0.89 MG/DL — SIGNIFICANT CHANGE UP (ref 0.5–1.3)
CULTURE RESULTS: SIGNIFICANT CHANGE UP
CULTURE RESULTS: SIGNIFICANT CHANGE UP
EGFR: 66 ML/MIN/1.73M2 — SIGNIFICANT CHANGE UP
EGFR: 66 ML/MIN/1.73M2 — SIGNIFICANT CHANGE UP
GLUCOSE SERPL-MCNC: 169 MG/DL — HIGH (ref 70–99)
HCT VFR BLD CALC: 40.2 % — SIGNIFICANT CHANGE UP (ref 34.5–45)
HGB BLD-MCNC: 13.3 G/DL — SIGNIFICANT CHANGE UP (ref 11.5–15.5)
MAGNESIUM SERPL-MCNC: 2 MG/DL — SIGNIFICANT CHANGE UP (ref 1.6–2.6)
MCHC RBC-ENTMCNC: 30.2 PG — SIGNIFICANT CHANGE UP (ref 27–34)
MCHC RBC-ENTMCNC: 33.1 G/DL — SIGNIFICANT CHANGE UP (ref 32–36)
MCV RBC AUTO: 91.2 FL — SIGNIFICANT CHANGE UP (ref 80–100)
NRBC # BLD AUTO: 0 K/UL — SIGNIFICANT CHANGE UP (ref 0–0)
NRBC # BLD: 0 /100 WBCS — SIGNIFICANT CHANGE UP (ref 0–0)
NRBC # FLD: 0 K/UL — SIGNIFICANT CHANGE UP (ref 0–0)
NRBC BLD-RTO: 0 /100 WBCS — SIGNIFICANT CHANGE UP (ref 0–0)
PHOSPHATE SERPL-MCNC: 3.4 MG/DL — SIGNIFICANT CHANGE UP (ref 2.5–4.5)
PLATELET # BLD AUTO: 215 K/UL — SIGNIFICANT CHANGE UP (ref 150–400)
POTASSIUM SERPL-MCNC: 3.8 MMOL/L — SIGNIFICANT CHANGE UP (ref 3.5–5.3)
POTASSIUM SERPL-SCNC: 3.8 MMOL/L — SIGNIFICANT CHANGE UP (ref 3.5–5.3)
RBC # BLD: 4.41 M/UL — SIGNIFICANT CHANGE UP (ref 3.8–5.2)
RBC # FLD: 13.2 % — SIGNIFICANT CHANGE UP (ref 10.3–14.5)
SODIUM SERPL-SCNC: 137 MMOL/L — SIGNIFICANT CHANGE UP (ref 135–145)
SPECIMEN SOURCE: SIGNIFICANT CHANGE UP
SPECIMEN SOURCE: SIGNIFICANT CHANGE UP
WBC # BLD: 9.29 K/UL — SIGNIFICANT CHANGE UP (ref 3.8–10.5)
WBC # FLD AUTO: 9.29 K/UL — SIGNIFICANT CHANGE UP (ref 3.8–10.5)

## 2024-11-20 PROCEDURE — 71045 X-RAY EXAM CHEST 1 VIEW: CPT | Mod: 26

## 2024-11-20 PROCEDURE — 99232 SBSQ HOSP IP/OBS MODERATE 35: CPT | Mod: GC

## 2024-11-20 PROCEDURE — 99232 SBSQ HOSP IP/OBS MODERATE 35: CPT

## 2024-11-20 RX ORDER — ERYTHROMYCIN 5 MG/G
1 OINTMENT OPHTHALMIC
Refills: 0 | Status: DISCONTINUED | OUTPATIENT
Start: 2024-11-20 | End: 2024-11-25

## 2024-11-20 RX ADMIN — Medication 1000 MILLIGRAM(S): at 12:27

## 2024-11-20 RX ADMIN — Medication 1 DROP(S): at 05:23

## 2024-11-20 RX ADMIN — Medication 1 APPLICATION(S): at 12:30

## 2024-11-20 RX ADMIN — Medication 1 DROP(S): at 17:31

## 2024-11-20 RX ADMIN — LISINOPRIL 20 MILLIGRAM(S): 5 TABLET ORAL at 05:27

## 2024-11-20 RX ADMIN — Medication 1 DROP(S): at 19:46

## 2024-11-20 RX ADMIN — CEFTRIAXONE 100 MILLIGRAM(S): 500 INJECTION, POWDER, FOR SOLUTION INTRAMUSCULAR; INTRAVENOUS at 05:31

## 2024-11-20 RX ADMIN — Medication 1 DROP(S): at 12:27

## 2024-11-20 RX ADMIN — Medication 1 DROP(S): at 21:09

## 2024-11-20 RX ADMIN — Medication 1 DROP(S): at 10:43

## 2024-11-20 RX ADMIN — MOXIFLOXACIN HYDROCHLORIDE 250 MILLIGRAM(S): 400 TABLET, FILM COATED ORAL at 17:32

## 2024-11-20 RX ADMIN — Medication 100 MILLIGRAM(S): at 17:30

## 2024-11-20 RX ADMIN — Medication 1 DROP(S): at 02:08

## 2024-11-20 RX ADMIN — ERYTHROMYCIN 1 APPLICATION(S): 5 OINTMENT OPHTHALMIC at 17:30

## 2024-11-20 RX ADMIN — Medication 1 DROP(S): at 15:48

## 2024-11-20 RX ADMIN — Medication 1 DROP(S): at 13:57

## 2024-11-20 RX ADMIN — Medication 1 DROP(S): at 06:46

## 2024-11-20 RX ADMIN — Medication 1 DROP(S): at 00:11

## 2024-11-20 RX ADMIN — Medication 1 DROP(S): at 17:59

## 2024-11-20 RX ADMIN — Medication 1 DROP(S): at 15:32

## 2024-11-20 RX ADMIN — Medication 1 DROP(S): at 22:04

## 2024-11-20 RX ADMIN — CEFTRIAXONE 100 MILLIGRAM(S): 500 INJECTION, POWDER, FOR SOLUTION INTRAMUSCULAR; INTRAVENOUS at 17:30

## 2024-11-20 RX ADMIN — Medication 100 MILLIGRAM(S): at 05:27

## 2024-11-20 RX ADMIN — HEPARIN SODIUM 5000 UNIT(S): 1000 INJECTION INTRAVENOUS; SUBCUTANEOUS at 22:04

## 2024-11-20 RX ADMIN — Medication 2 TABLET(S): at 22:05

## 2024-11-20 RX ADMIN — Medication 1 DROP(S): at 18:58

## 2024-11-20 RX ADMIN — Medication 1 DROP(S): at 04:20

## 2024-11-20 RX ADMIN — Medication 1 DROP(S): at 07:12

## 2024-11-20 RX ADMIN — INSULIN LISPRO 1: 100 INJECTION, SOLUTION INTRAVENOUS; SUBCUTANEOUS at 09:29

## 2024-11-20 RX ADMIN — Medication 1 DROP(S): at 09:30

## 2024-11-20 RX ADMIN — Medication 1 DROP(S): at 08:06

## 2024-11-21 PROCEDURE — 99232 SBSQ HOSP IP/OBS MODERATE 35: CPT | Mod: GC

## 2024-11-21 RX ADMIN — Medication 1 DROP(S): at 10:09

## 2024-11-21 RX ADMIN — Medication 1 DROP(S): at 00:08

## 2024-11-21 RX ADMIN — Medication 1 DROP(S): at 14:07

## 2024-11-21 RX ADMIN — Medication 1000 MILLIGRAM(S): at 11:10

## 2024-11-21 RX ADMIN — ERYTHROMYCIN 1 APPLICATION(S): 5 OINTMENT OPHTHALMIC at 17:16

## 2024-11-21 RX ADMIN — Medication 1 DROP(S): at 12:36

## 2024-11-21 RX ADMIN — HEPARIN SODIUM 5000 UNIT(S): 1000 INJECTION INTRAVENOUS; SUBCUTANEOUS at 21:29

## 2024-11-21 RX ADMIN — Medication 1 DROP(S): at 11:11

## 2024-11-21 RX ADMIN — ERYTHROMYCIN 1 APPLICATION(S): 5 OINTMENT OPHTHALMIC at 06:26

## 2024-11-21 RX ADMIN — Medication 1 DROP(S): at 08:15

## 2024-11-21 RX ADMIN — Medication 1 DROP(S): at 16:04

## 2024-11-21 RX ADMIN — Medication 1 DROP(S): at 07:12

## 2024-11-21 RX ADMIN — Medication 1 DROP(S): at 17:53

## 2024-11-21 RX ADMIN — Medication 1 DROP(S): at 17:17

## 2024-11-21 RX ADMIN — Medication 1 APPLICATION(S): at 11:11

## 2024-11-21 RX ADMIN — Medication 1 DROP(S): at 19:03

## 2024-11-21 RX ADMIN — Medication 1 DROP(S): at 16:03

## 2024-11-21 RX ADMIN — Medication 1 DROP(S): at 09:03

## 2024-11-21 RX ADMIN — MOXIFLOXACIN HYDROCHLORIDE 250 MILLIGRAM(S): 400 TABLET, FILM COATED ORAL at 17:53

## 2024-11-21 RX ADMIN — Medication 1 DROP(S): at 02:03

## 2024-11-21 RX ADMIN — Medication 1 DROP(S): at 21:29

## 2024-11-21 RX ADMIN — Medication 1 DROP(S): at 19:37

## 2024-11-21 RX ADMIN — INSULIN LISPRO 1: 100 INJECTION, SOLUTION INTRAVENOUS; SUBCUTANEOUS at 09:02

## 2024-11-21 RX ADMIN — Medication 100 MILLIGRAM(S): at 06:05

## 2024-11-21 RX ADMIN — Medication 1 DROP(S): at 06:06

## 2024-11-21 RX ADMIN — LISINOPRIL 20 MILLIGRAM(S): 5 TABLET ORAL at 06:05

## 2024-11-21 RX ADMIN — Medication 1 DROP(S): at 04:13

## 2024-11-21 RX ADMIN — HEPARIN SODIUM 5000 UNIT(S): 1000 INJECTION INTRAVENOUS; SUBCUTANEOUS at 06:05

## 2024-11-21 RX ADMIN — INSULIN LISPRO 2: 100 INJECTION, SOLUTION INTRAVENOUS; SUBCUTANEOUS at 12:35

## 2024-11-21 RX ADMIN — Medication 1 DROP(S): at 22:26

## 2024-11-21 RX ADMIN — CEFTRIAXONE 100 MILLIGRAM(S): 500 INJECTION, POWDER, FOR SOLUTION INTRAMUSCULAR; INTRAVENOUS at 06:16

## 2024-11-21 RX ADMIN — Medication 100 MILLIGRAM(S): at 17:15

## 2024-11-21 RX ADMIN — CEFTRIAXONE 100 MILLIGRAM(S): 500 INJECTION, POWDER, FOR SOLUTION INTRAMUSCULAR; INTRAVENOUS at 17:16

## 2024-11-22 PROCEDURE — 99232 SBSQ HOSP IP/OBS MODERATE 35: CPT | Mod: GC

## 2024-11-22 PROCEDURE — 99232 SBSQ HOSP IP/OBS MODERATE 35: CPT

## 2024-11-22 RX ORDER — ONDANSETRON HCL/PF 4 MG/2 ML
4 VIAL (ML) INJECTION EVERY 8 HOURS
Refills: 0 | Status: DISCONTINUED | OUTPATIENT
Start: 2024-11-22 | End: 2024-11-28

## 2024-11-22 RX ORDER — ACETAMINOPHEN 500 MG/5ML
650 LIQUID (ML) ORAL EVERY 6 HOURS
Refills: 0 | Status: DISCONTINUED | OUTPATIENT
Start: 2024-11-22 | End: 2024-11-24

## 2024-11-22 RX ADMIN — Medication 1 DROP(S): at 08:11

## 2024-11-22 RX ADMIN — HEPARIN SODIUM 5000 UNIT(S): 1000 INJECTION INTRAVENOUS; SUBCUTANEOUS at 13:56

## 2024-11-22 RX ADMIN — Medication 1 DROP(S): at 12:20

## 2024-11-22 RX ADMIN — Medication 1 DROP(S): at 16:36

## 2024-11-22 RX ADMIN — Medication 1 DROP(S): at 17:21

## 2024-11-22 RX ADMIN — Medication 1000 MILLIGRAM(S): at 12:05

## 2024-11-22 RX ADMIN — CEFTRIAXONE 100 MILLIGRAM(S): 500 INJECTION, POWDER, FOR SOLUTION INTRAMUSCULAR; INTRAVENOUS at 17:20

## 2024-11-22 RX ADMIN — Medication 1 DROP(S): at 18:17

## 2024-11-22 RX ADMIN — Medication 1 DROP(S): at 00:48

## 2024-11-22 RX ADMIN — Medication 1 DROP(S): at 21:15

## 2024-11-22 RX ADMIN — Medication 1 DROP(S): at 22:20

## 2024-11-22 RX ADMIN — Medication 100 MILLIGRAM(S): at 06:27

## 2024-11-22 RX ADMIN — Medication 1 DROP(S): at 09:35

## 2024-11-22 RX ADMIN — HEPARIN SODIUM 5000 UNIT(S): 1000 INJECTION INTRAVENOUS; SUBCUTANEOUS at 06:27

## 2024-11-22 RX ADMIN — Medication 650 MILLIGRAM(S): at 19:20

## 2024-11-22 RX ADMIN — Medication 1 DROP(S): at 04:53

## 2024-11-22 RX ADMIN — ERYTHROMYCIN 1 APPLICATION(S): 5 OINTMENT OPHTHALMIC at 18:21

## 2024-11-22 RX ADMIN — Medication 1 DROP(S): at 07:18

## 2024-11-22 RX ADMIN — Medication 1 DROP(S): at 09:54

## 2024-11-22 RX ADMIN — MOXIFLOXACIN HYDROCHLORIDE 250 MILLIGRAM(S): 400 TABLET, FILM COATED ORAL at 18:16

## 2024-11-22 RX ADMIN — LISINOPRIL 20 MILLIGRAM(S): 5 TABLET ORAL at 06:26

## 2024-11-22 RX ADMIN — ERYTHROMYCIN 1 APPLICATION(S): 5 OINTMENT OPHTHALMIC at 06:28

## 2024-11-22 RX ADMIN — Medication 1 APPLICATION(S): at 12:05

## 2024-11-22 RX ADMIN — Medication 100 MILLIGRAM(S): at 17:20

## 2024-11-22 RX ADMIN — Medication 1 DROP(S): at 13:56

## 2024-11-22 RX ADMIN — Medication 1 DROP(S): at 05:38

## 2024-11-22 RX ADMIN — Medication 1 DROP(S): at 14:21

## 2024-11-22 RX ADMIN — INSULIN LISPRO 1: 100 INJECTION, SOLUTION INTRAVENOUS; SUBCUTANEOUS at 12:40

## 2024-11-22 RX ADMIN — Medication 650 MILLIGRAM(S): at 18:28

## 2024-11-22 RX ADMIN — INSULIN LISPRO 1: 100 INJECTION, SOLUTION INTRAVENOUS; SUBCUTANEOUS at 09:31

## 2024-11-22 RX ADMIN — Medication 1 DROP(S): at 06:27

## 2024-11-22 RX ADMIN — Medication 1 DROP(S): at 02:27

## 2024-11-22 RX ADMIN — Medication 1 DROP(S): at 20:35

## 2024-11-22 RX ADMIN — Medication 1 DROP(S): at 11:15

## 2024-11-22 RX ADMIN — CEFTRIAXONE 100 MILLIGRAM(S): 500 INJECTION, POWDER, FOR SOLUTION INTRAMUSCULAR; INTRAVENOUS at 06:26

## 2024-11-22 RX ADMIN — Medication 1 DROP(S): at 19:53

## 2024-11-23 LAB
ANION GAP SERPL CALC-SCNC: 15 MMOL/L — HIGH (ref 7–14)
BUN SERPL-MCNC: 19 MG/DL — SIGNIFICANT CHANGE UP (ref 7–23)
CALCIUM SERPL-MCNC: 8.8 MG/DL — SIGNIFICANT CHANGE UP (ref 8.4–10.5)
CHLORIDE SERPL-SCNC: 104 MMOL/L — SIGNIFICANT CHANGE UP (ref 98–107)
CO2 SERPL-SCNC: 21 MMOL/L — LOW (ref 22–31)
CREAT SERPL-MCNC: 0.7 MG/DL — SIGNIFICANT CHANGE UP (ref 0.5–1.3)
EGFR: 88 ML/MIN/1.73M2 — SIGNIFICANT CHANGE UP
EGFR: 88 ML/MIN/1.73M2 — SIGNIFICANT CHANGE UP
GLUCOSE SERPL-MCNC: 137 MG/DL — HIGH (ref 70–99)
HCT VFR BLD CALC: 37.2 % — SIGNIFICANT CHANGE UP (ref 34.5–45)
HGB BLD-MCNC: 12.3 G/DL — SIGNIFICANT CHANGE UP (ref 11.5–15.5)
MAGNESIUM SERPL-MCNC: 2 MG/DL — SIGNIFICANT CHANGE UP (ref 1.6–2.6)
MCHC RBC-ENTMCNC: 30.1 PG — SIGNIFICANT CHANGE UP (ref 27–34)
MCHC RBC-ENTMCNC: 33.1 G/DL — SIGNIFICANT CHANGE UP (ref 32–36)
MCV RBC AUTO: 91.2 FL — SIGNIFICANT CHANGE UP (ref 80–100)
NRBC # BLD AUTO: 0 K/UL — SIGNIFICANT CHANGE UP (ref 0–0)
NRBC # BLD: 0 /100 WBCS — SIGNIFICANT CHANGE UP (ref 0–0)
NRBC # FLD: 0 K/UL — SIGNIFICANT CHANGE UP (ref 0–0)
NRBC BLD-RTO: 0 /100 WBCS — SIGNIFICANT CHANGE UP (ref 0–0)
PHOSPHATE SERPL-MCNC: 2.8 MG/DL — SIGNIFICANT CHANGE UP (ref 2.5–4.5)
PLATELET # BLD AUTO: 190 K/UL — SIGNIFICANT CHANGE UP (ref 150–400)
POTASSIUM SERPL-MCNC: 3.6 MMOL/L — SIGNIFICANT CHANGE UP (ref 3.5–5.3)
POTASSIUM SERPL-SCNC: 3.6 MMOL/L — SIGNIFICANT CHANGE UP (ref 3.5–5.3)
RBC # BLD: 4.08 M/UL — SIGNIFICANT CHANGE UP (ref 3.8–5.2)
RBC # FLD: 13.1 % — SIGNIFICANT CHANGE UP (ref 10.3–14.5)
SODIUM SERPL-SCNC: 140 MMOL/L — SIGNIFICANT CHANGE UP (ref 135–145)
WBC # BLD: 8.88 K/UL — SIGNIFICANT CHANGE UP (ref 3.8–10.5)
WBC # FLD AUTO: 8.88 K/UL — SIGNIFICANT CHANGE UP (ref 3.8–10.5)

## 2024-11-23 PROCEDURE — 99233 SBSQ HOSP IP/OBS HIGH 50: CPT | Mod: GC

## 2024-11-23 RX ORDER — LISINOPRIL 5 MG/1
20 TABLET ORAL DAILY
Refills: 0 | Status: DISCONTINUED | OUTPATIENT
Start: 2024-11-23 | End: 2024-11-30

## 2024-11-23 RX ADMIN — Medication 650 MILLIGRAM(S): at 21:57

## 2024-11-23 RX ADMIN — Medication 650 MILLIGRAM(S): at 22:50

## 2024-11-23 RX ADMIN — Medication 1 DROP(S): at 17:50

## 2024-11-23 RX ADMIN — Medication 1 DROP(S): at 13:43

## 2024-11-23 RX ADMIN — Medication 1 APPLICATION(S): at 11:49

## 2024-11-23 RX ADMIN — Medication 1 DROP(S): at 08:01

## 2024-11-23 RX ADMIN — Medication 1 DROP(S): at 05:11

## 2024-11-23 RX ADMIN — Medication 1 DROP(S): at 07:05

## 2024-11-23 RX ADMIN — Medication 1 DROP(S): at 20:03

## 2024-11-23 RX ADMIN — Medication 1 DROP(S): at 11:48

## 2024-11-23 RX ADMIN — Medication 1 DROP(S): at 18:22

## 2024-11-23 RX ADMIN — Medication 100 MILLIGRAM(S): at 18:23

## 2024-11-23 RX ADMIN — ERYTHROMYCIN 1 APPLICATION(S): 5 OINTMENT OPHTHALMIC at 18:22

## 2024-11-23 RX ADMIN — Medication 2 TABLET(S): at 21:56

## 2024-11-23 RX ADMIN — Medication 1 DROP(S): at 09:31

## 2024-11-23 RX ADMIN — Medication 1 DROP(S): at 02:09

## 2024-11-23 RX ADMIN — Medication 1000 MILLIGRAM(S): at 11:49

## 2024-11-23 RX ADMIN — ERYTHROMYCIN 1 APPLICATION(S): 5 OINTMENT OPHTHALMIC at 06:14

## 2024-11-23 RX ADMIN — CEFTRIAXONE 100 MILLIGRAM(S): 500 INJECTION, POWDER, FOR SOLUTION INTRAMUSCULAR; INTRAVENOUS at 06:14

## 2024-11-23 RX ADMIN — Medication 1 DROP(S): at 00:11

## 2024-11-23 RX ADMIN — Medication 1 DROP(S): at 22:01

## 2024-11-23 RX ADMIN — Medication 1 DROP(S): at 14:49

## 2024-11-23 RX ADMIN — Medication 1 DROP(S): at 19:30

## 2024-11-23 RX ADMIN — CEFTRIAXONE 100 MILLIGRAM(S): 500 INJECTION, POWDER, FOR SOLUTION INTRAMUSCULAR; INTRAVENOUS at 18:22

## 2024-11-23 RX ADMIN — MOXIFLOXACIN HYDROCHLORIDE 250 MILLIGRAM(S): 400 TABLET, FILM COATED ORAL at 19:30

## 2024-11-23 RX ADMIN — Medication 100 MILLIGRAM(S): at 06:14

## 2024-11-23 RX ADMIN — Medication 1 DROP(S): at 04:06

## 2024-11-23 RX ADMIN — Medication 1 DROP(S): at 16:03

## 2024-11-23 RX ADMIN — Medication 1 DROP(S): at 12:55

## 2024-11-23 RX ADMIN — Medication 1 DROP(S): at 15:20

## 2024-11-23 RX ADMIN — Medication 1 DROP(S): at 06:14

## 2024-11-23 RX ADMIN — LISINOPRIL 20 MILLIGRAM(S): 5 TABLET ORAL at 06:19

## 2024-11-23 RX ADMIN — INSULIN LISPRO 1: 100 INJECTION, SOLUTION INTRAVENOUS; SUBCUTANEOUS at 12:55

## 2024-11-23 RX ADMIN — Medication 1 DROP(S): at 21:23

## 2024-11-23 RX ADMIN — Medication 1 DROP(S): at 10:42

## 2024-11-24 LAB
ANION GAP SERPL CALC-SCNC: 12 MMOL/L — SIGNIFICANT CHANGE UP (ref 7–14)
BUN SERPL-MCNC: 18 MG/DL — SIGNIFICANT CHANGE UP (ref 7–23)
CALCIUM SERPL-MCNC: 8.8 MG/DL — SIGNIFICANT CHANGE UP (ref 8.4–10.5)
CHLORIDE SERPL-SCNC: 103 MMOL/L — SIGNIFICANT CHANGE UP (ref 98–107)
CO2 SERPL-SCNC: 23 MMOL/L — SIGNIFICANT CHANGE UP (ref 22–31)
CREAT SERPL-MCNC: 0.75 MG/DL — SIGNIFICANT CHANGE UP (ref 0.5–1.3)
EGFR: 81 ML/MIN/1.73M2 — SIGNIFICANT CHANGE UP
EGFR: 81 ML/MIN/1.73M2 — SIGNIFICANT CHANGE UP
GLUCOSE SERPL-MCNC: 150 MG/DL — HIGH (ref 70–99)
POTASSIUM SERPL-MCNC: 3.5 MMOL/L — SIGNIFICANT CHANGE UP (ref 3.5–5.3)
POTASSIUM SERPL-SCNC: 3.5 MMOL/L — SIGNIFICANT CHANGE UP (ref 3.5–5.3)
SODIUM SERPL-SCNC: 138 MMOL/L — SIGNIFICANT CHANGE UP (ref 135–145)

## 2024-11-24 PROCEDURE — 99231 SBSQ HOSP IP/OBS SF/LOW 25: CPT | Mod: GC

## 2024-11-24 RX ORDER — ACETAMINOPHEN 500 MG/5ML
650 LIQUID (ML) ORAL EVERY 6 HOURS
Refills: 0 | Status: COMPLETED | OUTPATIENT
Start: 2024-11-24 | End: 2024-11-27

## 2024-11-24 RX ADMIN — Medication 1 DROP(S): at 02:12

## 2024-11-24 RX ADMIN — MOXIFLOXACIN HYDROCHLORIDE 250 MILLIGRAM(S): 400 TABLET, FILM COATED ORAL at 19:18

## 2024-11-24 RX ADMIN — INSULIN LISPRO 1: 100 INJECTION, SOLUTION INTRAVENOUS; SUBCUTANEOUS at 13:13

## 2024-11-24 RX ADMIN — Medication 1 DROP(S): at 20:00

## 2024-11-24 RX ADMIN — Medication 1000 MILLIGRAM(S): at 11:05

## 2024-11-24 RX ADMIN — Medication 1 DROP(S): at 05:11

## 2024-11-24 RX ADMIN — CEFTRIAXONE 100 MILLIGRAM(S): 500 INJECTION, POWDER, FOR SOLUTION INTRAMUSCULAR; INTRAVENOUS at 18:44

## 2024-11-24 RX ADMIN — Medication 1 DROP(S): at 06:14

## 2024-11-24 RX ADMIN — Medication 1 DROP(S): at 10:42

## 2024-11-24 RX ADMIN — LISINOPRIL 20 MILLIGRAM(S): 5 TABLET ORAL at 06:14

## 2024-11-24 RX ADMIN — Medication 1 DROP(S): at 12:19

## 2024-11-24 RX ADMIN — Medication 1 DROP(S): at 18:45

## 2024-11-24 RX ADMIN — Medication 1 DROP(S): at 13:13

## 2024-11-24 RX ADMIN — CEFTRIAXONE 100 MILLIGRAM(S): 500 INJECTION, POWDER, FOR SOLUTION INTRAMUSCULAR; INTRAVENOUS at 05:12

## 2024-11-24 RX ADMIN — Medication 1 DROP(S): at 22:04

## 2024-11-24 RX ADMIN — Medication 650 MILLIGRAM(S): at 18:11

## 2024-11-24 RX ADMIN — Medication 100 MILLIGRAM(S): at 18:44

## 2024-11-24 RX ADMIN — Medication 1 DROP(S): at 04:03

## 2024-11-24 RX ADMIN — Medication 1 DROP(S): at 11:13

## 2024-11-24 RX ADMIN — Medication 650 MILLIGRAM(S): at 17:11

## 2024-11-24 RX ADMIN — Medication 1 DROP(S): at 07:05

## 2024-11-24 RX ADMIN — Medication 1 DROP(S): at 14:55

## 2024-11-24 RX ADMIN — Medication 1 DROP(S): at 15:46

## 2024-11-24 RX ADMIN — HEPARIN SODIUM 5000 UNIT(S): 1000 INJECTION INTRAVENOUS; SUBCUTANEOUS at 06:15

## 2024-11-24 RX ADMIN — ERYTHROMYCIN 1 APPLICATION(S): 5 OINTMENT OPHTHALMIC at 17:12

## 2024-11-24 RX ADMIN — ERYTHROMYCIN 1 APPLICATION(S): 5 OINTMENT OPHTHALMIC at 06:14

## 2024-11-24 RX ADMIN — Medication 1 DROP(S): at 17:11

## 2024-11-24 RX ADMIN — Medication 1 APPLICATION(S): at 11:06

## 2024-11-24 RX ADMIN — Medication 1 DROP(S): at 08:20

## 2024-11-24 RX ADMIN — Medication 1 DROP(S): at 16:53

## 2024-11-24 RX ADMIN — Medication 1 DROP(S): at 00:02

## 2024-11-24 RX ADMIN — Medication 100 MILLIGRAM(S): at 06:15

## 2024-11-24 RX ADMIN — Medication 1 DROP(S): at 21:08

## 2024-11-24 RX ADMIN — Medication 1 DROP(S): at 09:38

## 2024-11-24 RX ADMIN — Medication 1 DROP(S): at 19:21

## 2024-11-25 PROCEDURE — 99223 1ST HOSP IP/OBS HIGH 75: CPT

## 2024-11-25 PROCEDURE — 99232 SBSQ HOSP IP/OBS MODERATE 35: CPT | Mod: GC

## 2024-11-25 PROCEDURE — 99232 SBSQ HOSP IP/OBS MODERATE 35: CPT

## 2024-11-25 RX ORDER — CEFTRIAXONE 500 MG/1
2000 INJECTION, POWDER, FOR SOLUTION INTRAMUSCULAR; INTRAVENOUS EVERY 12 HOURS
Refills: 0 | Status: DISCONTINUED | OUTPATIENT
Start: 2024-11-25 | End: 2024-12-01

## 2024-11-25 RX ORDER — LANOLIN/MINERAL OIL/PETROLATUM
1 OINTMENT (GRAM) OPHTHALMIC (EYE)
Refills: 0 | Status: DISCONTINUED | OUTPATIENT
Start: 2024-11-25 | End: 2024-12-07

## 2024-11-25 RX ORDER — ERYTHROMYCIN 5 MG/G
1 OINTMENT OPHTHALMIC
Refills: 0 | Status: DISCONTINUED | OUTPATIENT
Start: 2024-11-25 | End: 2024-12-07

## 2024-11-25 RX ADMIN — Medication 100 MILLIGRAM(S): at 17:21

## 2024-11-25 RX ADMIN — Medication 1 DROP(S): at 18:00

## 2024-11-25 RX ADMIN — Medication 1000 MILLIGRAM(S): at 11:33

## 2024-11-25 RX ADMIN — Medication 650 MILLIGRAM(S): at 06:40

## 2024-11-25 RX ADMIN — ERYTHROMYCIN 1 APPLICATION(S): 5 OINTMENT OPHTHALMIC at 19:44

## 2024-11-25 RX ADMIN — Medication 1 DROP(S): at 17:21

## 2024-11-25 RX ADMIN — CEFTRIAXONE 100 MILLIGRAM(S): 500 INJECTION, POWDER, FOR SOLUTION INTRAMUSCULAR; INTRAVENOUS at 12:24

## 2024-11-25 RX ADMIN — Medication 650 MILLIGRAM(S): at 06:05

## 2024-11-25 RX ADMIN — Medication 1 DROP(S): at 07:06

## 2024-11-25 RX ADMIN — MOXIFLOXACIN HYDROCHLORIDE 250 MILLIGRAM(S): 400 TABLET, FILM COATED ORAL at 17:21

## 2024-11-25 RX ADMIN — Medication 650 MILLIGRAM(S): at 17:21

## 2024-11-25 RX ADMIN — Medication 1 DROP(S): at 05:11

## 2024-11-25 RX ADMIN — Medication 1 DROP(S): at 10:19

## 2024-11-25 RX ADMIN — Medication 650 MILLIGRAM(S): at 18:21

## 2024-11-25 RX ADMIN — Medication 650 MILLIGRAM(S): at 00:00

## 2024-11-25 RX ADMIN — Medication 1 DROP(S): at 19:44

## 2024-11-25 RX ADMIN — Medication 2 TABLET(S): at 22:10

## 2024-11-25 RX ADMIN — Medication 1 DROP(S): at 08:05

## 2024-11-25 RX ADMIN — Medication 1 DROP(S): at 17:20

## 2024-11-25 RX ADMIN — Medication 1 DROP(S): at 14:05

## 2024-11-25 RX ADMIN — Medication 650 MILLIGRAM(S): at 11:33

## 2024-11-25 RX ADMIN — Medication 1 DROP(S): at 12:45

## 2024-11-25 RX ADMIN — Medication 650 MILLIGRAM(S): at 00:01

## 2024-11-25 RX ADMIN — Medication 1 DROP(S): at 06:06

## 2024-11-25 RX ADMIN — Medication 650 MILLIGRAM(S): at 12:33

## 2024-11-25 RX ADMIN — ERYTHROMYCIN 1 APPLICATION(S): 5 OINTMENT OPHTHALMIC at 06:14

## 2024-11-25 RX ADMIN — Medication 100 MILLIGRAM(S): at 06:05

## 2024-11-25 RX ADMIN — Medication 1 DROP(S): at 02:17

## 2024-11-25 RX ADMIN — Medication 1 DROP(S): at 22:10

## 2024-11-25 RX ADMIN — Medication 1 DROP(S): at 12:25

## 2024-11-25 RX ADMIN — Medication 1 DROP(S): at 11:34

## 2024-11-25 RX ADMIN — Medication 1 APPLICATION(S): at 11:34

## 2024-11-25 RX ADMIN — Medication 1 DROP(S): at 04:00

## 2024-11-25 RX ADMIN — Medication 1 DROP(S): at 10:47

## 2024-11-25 RX ADMIN — CEFTRIAXONE 100 MILLIGRAM(S): 500 INJECTION, POWDER, FOR SOLUTION INTRAMUSCULAR; INTRAVENOUS at 17:21

## 2024-11-25 RX ADMIN — Medication 1 DROP(S): at 00:00

## 2024-11-25 RX ADMIN — LISINOPRIL 20 MILLIGRAM(S): 5 TABLET ORAL at 06:06

## 2024-11-25 RX ADMIN — Medication 1 DROP(S): at 16:38

## 2024-11-26 PROCEDURE — 99232 SBSQ HOSP IP/OBS MODERATE 35: CPT

## 2024-11-26 PROCEDURE — 99232 SBSQ HOSP IP/OBS MODERATE 35: CPT | Mod: GC

## 2024-11-26 RX ORDER — VANCOMYCIN HCL IN 5 % DEXTROSE 1.5G/250ML
1 PLASTIC BAG, INJECTION (ML) INTRAVENOUS
Refills: 0 | Status: DISCONTINUED | OUTPATIENT
Start: 2024-11-26 | End: 2024-12-07

## 2024-11-26 RX ADMIN — Medication 650 MILLIGRAM(S): at 18:31

## 2024-11-26 RX ADMIN — Medication 1 DROP(S): at 12:01

## 2024-11-26 RX ADMIN — Medication 650 MILLIGRAM(S): at 05:51

## 2024-11-26 RX ADMIN — Medication 1 DROP(S): at 13:29

## 2024-11-26 RX ADMIN — CEFTRIAXONE 100 MILLIGRAM(S): 500 INJECTION, POWDER, FOR SOLUTION INTRAMUSCULAR; INTRAVENOUS at 17:31

## 2024-11-26 RX ADMIN — Medication 1 DROP(S): at 13:30

## 2024-11-26 RX ADMIN — HEPARIN SODIUM 5000 UNIT(S): 1000 INJECTION INTRAVENOUS; SUBCUTANEOUS at 22:55

## 2024-11-26 RX ADMIN — Medication 1 DROP(S): at 00:41

## 2024-11-26 RX ADMIN — MOXIFLOXACIN HYDROCHLORIDE 250 MILLIGRAM(S): 400 TABLET, FILM COATED ORAL at 17:32

## 2024-11-26 RX ADMIN — CEFTRIAXONE 100 MILLIGRAM(S): 500 INJECTION, POWDER, FOR SOLUTION INTRAMUSCULAR; INTRAVENOUS at 05:42

## 2024-11-26 RX ADMIN — Medication 1 DROP(S): at 17:33

## 2024-11-26 RX ADMIN — Medication 650 MILLIGRAM(S): at 00:42

## 2024-11-26 RX ADMIN — Medication 1 APPLICATION(S): at 12:00

## 2024-11-26 RX ADMIN — Medication 1 DROP(S): at 10:58

## 2024-11-26 RX ADMIN — ERYTHROMYCIN 1 APPLICATION(S): 5 OINTMENT OPHTHALMIC at 09:16

## 2024-11-26 RX ADMIN — Medication 650 MILLIGRAM(S): at 17:32

## 2024-11-26 RX ADMIN — ERYTHROMYCIN 1 APPLICATION(S): 5 OINTMENT OPHTHALMIC at 20:47

## 2024-11-26 RX ADMIN — Medication 1 DROP(S): at 16:36

## 2024-11-26 RX ADMIN — Medication 1 DROP(S): at 23:14

## 2024-11-26 RX ADMIN — Medication 1 DROP(S): at 12:00

## 2024-11-26 RX ADMIN — LISINOPRIL 20 MILLIGRAM(S): 5 TABLET ORAL at 05:50

## 2024-11-26 RX ADMIN — INSULIN LISPRO 2: 100 INJECTION, SOLUTION INTRAVENOUS; SUBCUTANEOUS at 13:00

## 2024-11-26 RX ADMIN — Medication 1 DROP(S): at 09:16

## 2024-11-26 RX ADMIN — Medication 100 MILLIGRAM(S): at 05:49

## 2024-11-26 RX ADMIN — Medication 650 MILLIGRAM(S): at 01:42

## 2024-11-26 RX ADMIN — Medication 1000 MILLIGRAM(S): at 11:59

## 2024-11-26 RX ADMIN — Medication 100 MILLIGRAM(S): at 17:32

## 2024-11-26 RX ADMIN — Medication 1 DROP(S): at 20:48

## 2024-11-26 RX ADMIN — Medication 1 DROP(S): at 06:01

## 2024-11-26 RX ADMIN — HEPARIN SODIUM 5000 UNIT(S): 1000 INJECTION INTRAVENOUS; SUBCUTANEOUS at 05:53

## 2024-11-26 RX ADMIN — Medication 1 DROP(S): at 16:35

## 2024-11-26 RX ADMIN — Medication 1 DROP(S): at 02:46

## 2024-11-26 RX ADMIN — Medication 650 MILLIGRAM(S): at 12:00

## 2024-11-26 RX ADMIN — Medication 1 DROP(S): at 05:51

## 2024-11-26 RX ADMIN — Medication 1 DROP(S): at 04:05

## 2024-11-26 RX ADMIN — INSULIN LISPRO 1: 100 INJECTION, SOLUTION INTRAVENOUS; SUBCUTANEOUS at 18:22

## 2024-11-26 RX ADMIN — Medication 650 MILLIGRAM(S): at 13:00

## 2024-11-26 RX ADMIN — INSULIN LISPRO 1: 100 INJECTION, SOLUTION INTRAVENOUS; SUBCUTANEOUS at 09:15

## 2024-11-27 LAB
ANION GAP SERPL CALC-SCNC: 12 MMOL/L — SIGNIFICANT CHANGE UP (ref 7–14)
BUN SERPL-MCNC: 17 MG/DL — SIGNIFICANT CHANGE UP (ref 7–23)
CALCIUM SERPL-MCNC: 8.9 MG/DL — SIGNIFICANT CHANGE UP (ref 8.4–10.5)
CHLORIDE SERPL-SCNC: 104 MMOL/L — SIGNIFICANT CHANGE UP (ref 98–107)
CO2 SERPL-SCNC: 24 MMOL/L — SIGNIFICANT CHANGE UP (ref 22–31)
CREAT SERPL-MCNC: 0.71 MG/DL — SIGNIFICANT CHANGE UP (ref 0.5–1.3)
EGFR: 87 ML/MIN/1.73M2 — SIGNIFICANT CHANGE UP
EGFR: 87 ML/MIN/1.73M2 — SIGNIFICANT CHANGE UP
GLUCOSE SERPL-MCNC: 128 MG/DL — HIGH (ref 70–99)
HCT VFR BLD CALC: 36.8 % — SIGNIFICANT CHANGE UP (ref 34.5–45)
HGB BLD-MCNC: 12.3 G/DL — SIGNIFICANT CHANGE UP (ref 11.5–15.5)
MAGNESIUM SERPL-MCNC: 2 MG/DL — SIGNIFICANT CHANGE UP (ref 1.6–2.6)
MCHC RBC-ENTMCNC: 30.3 PG — SIGNIFICANT CHANGE UP (ref 27–34)
MCHC RBC-ENTMCNC: 33.4 G/DL — SIGNIFICANT CHANGE UP (ref 32–36)
MCV RBC AUTO: 90.6 FL — SIGNIFICANT CHANGE UP (ref 80–100)
NRBC # BLD AUTO: 0 K/UL — SIGNIFICANT CHANGE UP (ref 0–0)
NRBC # BLD: 0 /100 WBCS — SIGNIFICANT CHANGE UP (ref 0–0)
NRBC # FLD: 0 K/UL — SIGNIFICANT CHANGE UP (ref 0–0)
NRBC BLD-RTO: 0 /100 WBCS — SIGNIFICANT CHANGE UP (ref 0–0)
PHOSPHATE SERPL-MCNC: 3.1 MG/DL — SIGNIFICANT CHANGE UP (ref 2.5–4.5)
PLATELET # BLD AUTO: 211 K/UL — SIGNIFICANT CHANGE UP (ref 150–400)
POTASSIUM SERPL-MCNC: 3.5 MMOL/L — SIGNIFICANT CHANGE UP (ref 3.5–5.3)
POTASSIUM SERPL-SCNC: 3.5 MMOL/L — SIGNIFICANT CHANGE UP (ref 3.5–5.3)
RBC # BLD: 4.06 M/UL — SIGNIFICANT CHANGE UP (ref 3.8–5.2)
RBC # FLD: 13 % — SIGNIFICANT CHANGE UP (ref 10.3–14.5)
SODIUM SERPL-SCNC: 140 MMOL/L — SIGNIFICANT CHANGE UP (ref 135–145)
WBC # BLD: 7.91 K/UL — SIGNIFICANT CHANGE UP (ref 3.8–10.5)
WBC # FLD AUTO: 7.91 K/UL — SIGNIFICANT CHANGE UP (ref 3.8–10.5)

## 2024-11-27 PROCEDURE — 99232 SBSQ HOSP IP/OBS MODERATE 35: CPT | Mod: GC

## 2024-11-27 RX ORDER — ACETAMINOPHEN 500 MG/5ML
650 LIQUID (ML) ORAL EVERY 6 HOURS
Refills: 0 | Status: DISCONTINUED | OUTPATIENT
Start: 2024-11-27 | End: 2024-12-07

## 2024-11-27 RX ADMIN — ERYTHROMYCIN 1 APPLICATION(S): 5 OINTMENT OPHTHALMIC at 07:28

## 2024-11-27 RX ADMIN — LISINOPRIL 20 MILLIGRAM(S): 5 TABLET ORAL at 05:32

## 2024-11-27 RX ADMIN — Medication 650 MILLIGRAM(S): at 18:16

## 2024-11-27 RX ADMIN — Medication 100 MILLIGRAM(S): at 05:31

## 2024-11-27 RX ADMIN — Medication 650 MILLIGRAM(S): at 00:50

## 2024-11-27 RX ADMIN — Medication 1 DROP(S): at 00:50

## 2024-11-27 RX ADMIN — ERYTHROMYCIN 1 APPLICATION(S): 5 OINTMENT OPHTHALMIC at 19:45

## 2024-11-27 RX ADMIN — Medication 1 APPLICATION(S): at 12:10

## 2024-11-27 RX ADMIN — Medication 1 DROP(S): at 13:10

## 2024-11-27 RX ADMIN — Medication 1 DROP(S): at 09:05

## 2024-11-27 RX ADMIN — Medication 650 MILLIGRAM(S): at 19:16

## 2024-11-27 RX ADMIN — CEFTRIAXONE 100 MILLIGRAM(S): 500 INJECTION, POWDER, FOR SOLUTION INTRAMUSCULAR; INTRAVENOUS at 05:30

## 2024-11-27 RX ADMIN — MOXIFLOXACIN HYDROCHLORIDE 250 MILLIGRAM(S): 400 TABLET, FILM COATED ORAL at 17:41

## 2024-11-27 RX ADMIN — Medication 1000 MILLIGRAM(S): at 12:11

## 2024-11-27 RX ADMIN — CEFTRIAXONE 100 MILLIGRAM(S): 500 INJECTION, POWDER, FOR SOLUTION INTRAMUSCULAR; INTRAVENOUS at 17:54

## 2024-11-27 RX ADMIN — Medication 1 DROP(S): at 17:38

## 2024-11-27 RX ADMIN — Medication 650 MILLIGRAM(S): at 05:30

## 2024-11-27 RX ADMIN — Medication 1 DROP(S): at 19:45

## 2024-11-27 RX ADMIN — Medication 1 DROP(S): at 12:10

## 2024-11-27 RX ADMIN — HEPARIN SODIUM 5000 UNIT(S): 1000 INJECTION INTRAVENOUS; SUBCUTANEOUS at 05:31

## 2024-11-27 RX ADMIN — INSULIN LISPRO 1: 100 INJECTION, SOLUTION INTRAVENOUS; SUBCUTANEOUS at 13:10

## 2024-11-27 RX ADMIN — Medication 1 DROP(S): at 09:06

## 2024-11-27 RX ADMIN — Medication 1 DROP(S): at 07:29

## 2024-11-27 RX ADMIN — INSULIN LISPRO 1: 100 INJECTION, SOLUTION INTRAVENOUS; SUBCUTANEOUS at 09:05

## 2024-11-27 RX ADMIN — Medication 1 DROP(S): at 17:40

## 2024-11-27 RX ADMIN — HEPARIN SODIUM 5000 UNIT(S): 1000 INJECTION INTRAVENOUS; SUBCUTANEOUS at 13:12

## 2024-11-27 RX ADMIN — Medication 1 DROP(S): at 22:10

## 2024-11-27 RX ADMIN — Medication 1 DROP(S): at 18:17

## 2024-11-27 RX ADMIN — POLYETHYLENE GLYCOL 3350 17 GRAM(S): 17 POWDER, FOR SOLUTION ORAL at 12:10

## 2024-11-27 RX ADMIN — Medication 4 MILLIGRAM(S): at 19:03

## 2024-11-27 RX ADMIN — Medication 100 MILLIGRAM(S): at 17:39

## 2024-11-27 RX ADMIN — Medication 1 DROP(S): at 05:32

## 2024-11-27 RX ADMIN — Medication 1 DROP(S): at 05:33

## 2024-11-27 RX ADMIN — Medication 1 DROP(S): at 07:28

## 2024-11-27 RX ADMIN — Medication 1 DROP(S): at 19:03

## 2024-11-28 PROCEDURE — 93010 ELECTROCARDIOGRAM REPORT: CPT

## 2024-11-28 PROCEDURE — 99232 SBSQ HOSP IP/OBS MODERATE 35: CPT | Mod: 25

## 2024-11-28 RX ADMIN — Medication 1 DROP(S): at 18:29

## 2024-11-28 RX ADMIN — Medication 1 DROP(S): at 12:25

## 2024-11-28 RX ADMIN — Medication 100 MILLIGRAM(S): at 18:33

## 2024-11-28 RX ADMIN — MOXIFLOXACIN HYDROCHLORIDE 250 MILLIGRAM(S): 400 TABLET, FILM COATED ORAL at 18:45

## 2024-11-28 RX ADMIN — Medication 1 DROP(S): at 00:02

## 2024-11-28 RX ADMIN — Medication 1 DROP(S): at 12:23

## 2024-11-28 RX ADMIN — Medication 1 DROP(S): at 19:53

## 2024-11-28 RX ADMIN — Medication 1 DROP(S): at 13:54

## 2024-11-28 RX ADMIN — Medication 1 DROP(S): at 22:13

## 2024-11-28 RX ADMIN — Medication 1 DROP(S): at 05:12

## 2024-11-28 RX ADMIN — CEFTRIAXONE 100 MILLIGRAM(S): 500 INJECTION, POWDER, FOR SOLUTION INTRAMUSCULAR; INTRAVENOUS at 06:19

## 2024-11-28 RX ADMIN — INSULIN LISPRO 1: 100 INJECTION, SOLUTION INTRAVENOUS; SUBCUTANEOUS at 09:07

## 2024-11-28 RX ADMIN — ERYTHROMYCIN 1 APPLICATION(S): 5 OINTMENT OPHTHALMIC at 08:01

## 2024-11-28 RX ADMIN — Medication 1 DROP(S): at 06:22

## 2024-11-28 RX ADMIN — Medication 1 APPLICATION(S): at 12:23

## 2024-11-28 RX ADMIN — Medication 1 DROP(S): at 08:01

## 2024-11-28 RX ADMIN — Medication 1000 MILLIGRAM(S): at 12:26

## 2024-11-28 RX ADMIN — ERYTHROMYCIN 1 APPLICATION(S): 5 OINTMENT OPHTHALMIC at 19:53

## 2024-11-28 RX ADMIN — HEPARIN SODIUM 5000 UNIT(S): 1000 INJECTION INTRAVENOUS; SUBCUTANEOUS at 13:54

## 2024-11-28 RX ADMIN — Medication 1 DROP(S): at 06:21

## 2024-11-28 RX ADMIN — CEFTRIAXONE 100 MILLIGRAM(S): 500 INJECTION, POWDER, FOR SOLUTION INTRAMUSCULAR; INTRAVENOUS at 18:40

## 2024-11-28 RX ADMIN — Medication 1 DROP(S): at 10:07

## 2024-11-28 RX ADMIN — Medication 1 DROP(S): at 18:28

## 2024-11-28 RX ADMIN — LISINOPRIL 20 MILLIGRAM(S): 5 TABLET ORAL at 06:20

## 2024-11-28 RX ADMIN — Medication 100 MILLIGRAM(S): at 06:20

## 2024-11-28 RX ADMIN — Medication 1 DROP(S): at 18:32

## 2024-11-29 PROCEDURE — 99232 SBSQ HOSP IP/OBS MODERATE 35: CPT | Mod: GC

## 2024-11-29 RX ADMIN — CEFTRIAXONE 100 MILLIGRAM(S): 500 INJECTION, POWDER, FOR SOLUTION INTRAMUSCULAR; INTRAVENOUS at 06:02

## 2024-11-29 RX ADMIN — Medication 1 DROP(S): at 23:30

## 2024-11-29 RX ADMIN — Medication 1 DROP(S): at 19:05

## 2024-11-29 RX ADMIN — ERYTHROMYCIN 1 APPLICATION(S): 5 OINTMENT OPHTHALMIC at 19:04

## 2024-11-29 RX ADMIN — POLYETHYLENE GLYCOL 3350 17 GRAM(S): 17 POWDER, FOR SOLUTION ORAL at 11:56

## 2024-11-29 RX ADMIN — Medication 1 DROP(S): at 07:20

## 2024-11-29 RX ADMIN — Medication 1 DROP(S): at 16:07

## 2024-11-29 RX ADMIN — ERYTHROMYCIN 1 APPLICATION(S): 5 OINTMENT OPHTHALMIC at 08:10

## 2024-11-29 RX ADMIN — Medication 100 MILLIGRAM(S): at 19:03

## 2024-11-29 RX ADMIN — Medication 1 DROP(S): at 19:04

## 2024-11-29 RX ADMIN — Medication 1 DROP(S): at 00:07

## 2024-11-29 RX ADMIN — Medication 1 DROP(S): at 06:04

## 2024-11-29 RX ADMIN — CEFTRIAXONE 100 MILLIGRAM(S): 500 INJECTION, POWDER, FOR SOLUTION INTRAMUSCULAR; INTRAVENOUS at 19:08

## 2024-11-29 RX ADMIN — Medication 1 DROP(S): at 10:10

## 2024-11-29 RX ADMIN — Medication 1 DROP(S): at 10:11

## 2024-11-29 RX ADMIN — Medication 1 DROP(S): at 11:55

## 2024-11-29 RX ADMIN — HEPARIN SODIUM 5000 UNIT(S): 1000 INJECTION INTRAVENOUS; SUBCUTANEOUS at 13:44

## 2024-11-29 RX ADMIN — Medication 1 DROP(S): at 13:44

## 2024-11-29 RX ADMIN — Medication 100 MILLIGRAM(S): at 06:03

## 2024-11-29 RX ADMIN — Medication 1 DROP(S): at 13:02

## 2024-11-29 RX ADMIN — INSULIN LISPRO 1: 100 INJECTION, SOLUTION INTRAVENOUS; SUBCUTANEOUS at 09:04

## 2024-11-29 RX ADMIN — Medication 1000 MILLIGRAM(S): at 11:56

## 2024-11-29 RX ADMIN — Medication 1 DROP(S): at 19:41

## 2024-11-29 RX ADMIN — MOXIFLOXACIN HYDROCHLORIDE 250 MILLIGRAM(S): 400 TABLET, FILM COATED ORAL at 19:41

## 2024-11-29 RX ADMIN — Medication 1 DROP(S): at 16:06

## 2024-11-29 RX ADMIN — Medication 1 DROP(S): at 08:09

## 2024-11-29 RX ADMIN — Medication 1 DROP(S): at 06:02

## 2024-11-29 RX ADMIN — Medication 1 APPLICATION(S): at 11:59

## 2024-11-30 PROCEDURE — 99232 SBSQ HOSP IP/OBS MODERATE 35: CPT

## 2024-11-30 PROCEDURE — 93010 ELECTROCARDIOGRAM REPORT: CPT

## 2024-11-30 RX ORDER — LISINOPRIL 5 MG/1
40 TABLET ORAL DAILY
Refills: 0 | Status: DISCONTINUED | OUTPATIENT
Start: 2024-11-30 | End: 2024-12-07

## 2024-11-30 RX ORDER — ONDANSETRON HCL/PF 4 MG/2 ML
4 VIAL (ML) INJECTION ONCE
Refills: 0 | Status: COMPLETED | OUTPATIENT
Start: 2024-11-30 | End: 2024-11-30

## 2024-11-30 RX ORDER — ONDANSETRON HCL/PF 4 MG/2 ML
4 VIAL (ML) INJECTION EVERY 8 HOURS
Refills: 0 | Status: DISCONTINUED | OUTPATIENT
Start: 2024-11-30 | End: 2024-12-07

## 2024-11-30 RX ADMIN — Medication 1 DROP(S): at 09:45

## 2024-11-30 RX ADMIN — ERYTHROMYCIN 1 APPLICATION(S): 5 OINTMENT OPHTHALMIC at 07:45

## 2024-11-30 RX ADMIN — ERYTHROMYCIN 1 APPLICATION(S): 5 OINTMENT OPHTHALMIC at 19:35

## 2024-11-30 RX ADMIN — INSULIN LISPRO 1: 100 INJECTION, SOLUTION INTRAVENOUS; SUBCUTANEOUS at 08:44

## 2024-11-30 RX ADMIN — Medication 1 DROP(S): at 06:01

## 2024-11-30 RX ADMIN — LISINOPRIL 20 MILLIGRAM(S): 5 TABLET ORAL at 06:02

## 2024-11-30 RX ADMIN — Medication 1 APPLICATION(S): at 12:12

## 2024-11-30 RX ADMIN — HEPARIN SODIUM 5000 UNIT(S): 1000 INJECTION INTRAVENOUS; SUBCUTANEOUS at 16:24

## 2024-11-30 RX ADMIN — Medication 1 DROP(S): at 16:25

## 2024-11-30 RX ADMIN — Medication 1 DROP(S): at 18:36

## 2024-11-30 RX ADMIN — Medication 1000 MILLIGRAM(S): at 12:06

## 2024-11-30 RX ADMIN — Medication 1 DROP(S): at 07:45

## 2024-11-30 RX ADMIN — Medication 4 MILLIGRAM(S): at 18:36

## 2024-11-30 RX ADMIN — Medication 1 DROP(S): at 19:35

## 2024-11-30 RX ADMIN — Medication 100 MILLIGRAM(S): at 17:16

## 2024-11-30 RX ADMIN — POLYETHYLENE GLYCOL 3350 17 GRAM(S): 17 POWDER, FOR SOLUTION ORAL at 12:05

## 2024-11-30 RX ADMIN — Medication 1 DROP(S): at 16:28

## 2024-11-30 RX ADMIN — Medication 1 DROP(S): at 12:08

## 2024-11-30 RX ADMIN — HEPARIN SODIUM 5000 UNIT(S): 1000 INJECTION INTRAVENOUS; SUBCUTANEOUS at 06:02

## 2024-11-30 RX ADMIN — CEFTRIAXONE 100 MILLIGRAM(S): 500 INJECTION, POWDER, FOR SOLUTION INTRAMUSCULAR; INTRAVENOUS at 17:16

## 2024-11-30 RX ADMIN — Medication 1 DROP(S): at 17:20

## 2024-11-30 RX ADMIN — Medication 1 DROP(S): at 23:03

## 2024-11-30 RX ADMIN — MOXIFLOXACIN HYDROCHLORIDE 250 MILLIGRAM(S): 400 TABLET, FILM COATED ORAL at 17:49

## 2024-11-30 RX ADMIN — Medication 1 DROP(S): at 16:26

## 2024-11-30 RX ADMIN — Medication 100 MILLIGRAM(S): at 06:02

## 2024-11-30 RX ADMIN — Medication 1 DROP(S): at 06:02

## 2024-11-30 RX ADMIN — CEFTRIAXONE 100 MILLIGRAM(S): 500 INJECTION, POWDER, FOR SOLUTION INTRAMUSCULAR; INTRAVENOUS at 06:01

## 2024-12-01 PROCEDURE — 99232 SBSQ HOSP IP/OBS MODERATE 35: CPT | Mod: 25

## 2024-12-01 RX ORDER — CEFTRIAXONE 500 MG/1
2000 INJECTION, POWDER, FOR SOLUTION INTRAMUSCULAR; INTRAVENOUS EVERY 12 HOURS
Refills: 0 | Status: DISCONTINUED | OUTPATIENT
Start: 2024-12-02 | End: 2024-12-05

## 2024-12-01 RX ORDER — MOXIFLOXACIN HYDROCHLORIDE 400 MG/1
400 TABLET, FILM COATED ORAL EVERY 24 HOURS
Refills: 0 | Status: DISCONTINUED | OUTPATIENT
Start: 2024-12-01 | End: 2024-12-05

## 2024-12-01 RX ADMIN — Medication 1000 MILLIGRAM(S): at 12:15

## 2024-12-01 RX ADMIN — Medication 1 DROP(S): at 07:43

## 2024-12-01 RX ADMIN — Medication 1 APPLICATION(S): at 12:15

## 2024-12-01 RX ADMIN — Medication 100 MILLIGRAM(S): at 06:24

## 2024-12-01 RX ADMIN — MOXIFLOXACIN HYDROCHLORIDE 250 MILLIGRAM(S): 400 TABLET, FILM COATED ORAL at 18:58

## 2024-12-01 RX ADMIN — ERYTHROMYCIN 1 APPLICATION(S): 5 OINTMENT OPHTHALMIC at 19:55

## 2024-12-01 RX ADMIN — Medication 1 DROP(S): at 22:12

## 2024-12-01 RX ADMIN — Medication 1 DROP(S): at 17:05

## 2024-12-01 RX ADMIN — Medication 1 DROP(S): at 18:59

## 2024-12-01 RX ADMIN — Medication 1 DROP(S): at 19:54

## 2024-12-01 RX ADMIN — Medication 1 DROP(S): at 06:24

## 2024-12-01 RX ADMIN — Medication 1 DROP(S): at 18:58

## 2024-12-01 RX ADMIN — Medication 1 DROP(S): at 10:11

## 2024-12-01 RX ADMIN — Medication 1 DROP(S): at 06:25

## 2024-12-01 RX ADMIN — Medication 1 DROP(S): at 12:15

## 2024-12-01 RX ADMIN — Medication 100 MILLIGRAM(S): at 18:58

## 2024-12-01 RX ADMIN — LISINOPRIL 40 MILLIGRAM(S): 5 TABLET ORAL at 07:43

## 2024-12-01 RX ADMIN — CEFTRIAXONE 100 MILLIGRAM(S): 500 INJECTION, POWDER, FOR SOLUTION INTRAMUSCULAR; INTRAVENOUS at 06:24

## 2024-12-01 RX ADMIN — ERYTHROMYCIN 1 APPLICATION(S): 5 OINTMENT OPHTHALMIC at 07:43

## 2024-12-01 RX ADMIN — Medication 1 DROP(S): at 14:40

## 2024-12-01 RX ADMIN — HEPARIN SODIUM 5000 UNIT(S): 1000 INJECTION INTRAVENOUS; SUBCUTANEOUS at 06:24

## 2024-12-02 LAB
ANION GAP SERPL CALC-SCNC: 14 MMOL/L — SIGNIFICANT CHANGE UP (ref 7–14)
BUN SERPL-MCNC: 19 MG/DL — SIGNIFICANT CHANGE UP (ref 7–23)
CALCIUM SERPL-MCNC: 9.3 MG/DL — SIGNIFICANT CHANGE UP (ref 8.4–10.5)
CHLORIDE SERPL-SCNC: 103 MMOL/L — SIGNIFICANT CHANGE UP (ref 98–107)
CO2 SERPL-SCNC: 24 MMOL/L — SIGNIFICANT CHANGE UP (ref 22–31)
CREAT SERPL-MCNC: 0.72 MG/DL — SIGNIFICANT CHANGE UP (ref 0.5–1.3)
EGFR: 86 ML/MIN/1.73M2 — SIGNIFICANT CHANGE UP
EGFR: 86 ML/MIN/1.73M2 — SIGNIFICANT CHANGE UP
GLUCOSE BLDC GLUCOMTR-MCNC: 117 MG/DL — HIGH (ref 70–99)
GLUCOSE BLDC GLUCOMTR-MCNC: 121 MG/DL — HIGH (ref 70–99)
GLUCOSE BLDC GLUCOMTR-MCNC: 139 MG/DL — HIGH (ref 70–99)
GLUCOSE SERPL-MCNC: 125 MG/DL — HIGH (ref 70–99)
HCT VFR BLD CALC: 40.1 % — SIGNIFICANT CHANGE UP (ref 34.5–45)
HGB BLD-MCNC: 12.8 G/DL — SIGNIFICANT CHANGE UP (ref 11.5–15.5)
MAGNESIUM SERPL-MCNC: 2.1 MG/DL — SIGNIFICANT CHANGE UP (ref 1.6–2.6)
MCHC RBC-ENTMCNC: 29.6 PG — SIGNIFICANT CHANGE UP (ref 27–34)
MCHC RBC-ENTMCNC: 31.9 G/DL — LOW (ref 32–36)
MCV RBC AUTO: 92.8 FL — SIGNIFICANT CHANGE UP (ref 80–100)
NRBC # BLD AUTO: 0 K/UL — SIGNIFICANT CHANGE UP (ref 0–0)
NRBC # BLD: 0 /100 WBCS — SIGNIFICANT CHANGE UP (ref 0–0)
NRBC # FLD: 0 K/UL — SIGNIFICANT CHANGE UP (ref 0–0)
NRBC BLD-RTO: 0 /100 WBCS — SIGNIFICANT CHANGE UP (ref 0–0)
PHOSPHATE SERPL-MCNC: 3.5 MG/DL — SIGNIFICANT CHANGE UP (ref 2.5–4.5)
PLATELET # BLD AUTO: 213 K/UL — SIGNIFICANT CHANGE UP (ref 150–400)
POTASSIUM SERPL-MCNC: 4 MMOL/L — SIGNIFICANT CHANGE UP (ref 3.5–5.3)
POTASSIUM SERPL-SCNC: 4 MMOL/L — SIGNIFICANT CHANGE UP (ref 3.5–5.3)
RBC # BLD: 4.32 M/UL — SIGNIFICANT CHANGE UP (ref 3.8–5.2)
RBC # FLD: 13.7 % — SIGNIFICANT CHANGE UP (ref 10.3–14.5)
SODIUM SERPL-SCNC: 141 MMOL/L — SIGNIFICANT CHANGE UP (ref 135–145)
WBC # BLD: 7.98 K/UL — SIGNIFICANT CHANGE UP (ref 3.8–10.5)
WBC # FLD AUTO: 7.98 K/UL — SIGNIFICANT CHANGE UP (ref 3.8–10.5)

## 2024-12-02 PROCEDURE — 99233 SBSQ HOSP IP/OBS HIGH 50: CPT

## 2024-12-02 PROCEDURE — 99232 SBSQ HOSP IP/OBS MODERATE 35: CPT

## 2024-12-02 RX ADMIN — Medication 1 DROP(S): at 00:11

## 2024-12-02 RX ADMIN — Medication 100 MILLIGRAM(S): at 18:04

## 2024-12-02 RX ADMIN — Medication 1 DROP(S): at 11:55

## 2024-12-02 RX ADMIN — Medication 1 DROP(S): at 18:05

## 2024-12-02 RX ADMIN — Medication 1 DROP(S): at 14:13

## 2024-12-02 RX ADMIN — ERYTHROMYCIN 1 APPLICATION(S): 5 OINTMENT OPHTHALMIC at 08:02

## 2024-12-02 RX ADMIN — CEFTRIAXONE 100 MILLIGRAM(S): 500 INJECTION, POWDER, FOR SOLUTION INTRAMUSCULAR; INTRAVENOUS at 18:04

## 2024-12-02 RX ADMIN — Medication 1 DROP(S): at 11:49

## 2024-12-02 RX ADMIN — Medication 1 DROP(S): at 06:07

## 2024-12-02 RX ADMIN — Medication 1 APPLICATION(S): at 11:52

## 2024-12-02 RX ADMIN — Medication 1 DROP(S): at 21:41

## 2024-12-02 RX ADMIN — Medication 1 DROP(S): at 10:41

## 2024-12-02 RX ADMIN — ERYTHROMYCIN 1 APPLICATION(S): 5 OINTMENT OPHTHALMIC at 19:31

## 2024-12-02 RX ADMIN — INSULIN LISPRO 1: 100 INJECTION, SOLUTION INTRAVENOUS; SUBCUTANEOUS at 09:00

## 2024-12-02 RX ADMIN — Medication 1000 MILLIGRAM(S): at 11:49

## 2024-12-02 RX ADMIN — POLYETHYLENE GLYCOL 3350 17 GRAM(S): 17 POWDER, FOR SOLUTION ORAL at 11:49

## 2024-12-02 RX ADMIN — Medication 1 DROP(S): at 15:53

## 2024-12-02 RX ADMIN — LISINOPRIL 40 MILLIGRAM(S): 5 TABLET ORAL at 06:39

## 2024-12-02 RX ADMIN — Medication 1 DROP(S): at 19:32

## 2024-12-02 RX ADMIN — Medication 1 DROP(S): at 08:02

## 2024-12-02 RX ADMIN — Medication 100 MILLIGRAM(S): at 06:39

## 2024-12-02 RX ADMIN — Medication 1 DROP(S): at 07:12

## 2024-12-02 RX ADMIN — MOXIFLOXACIN HYDROCHLORIDE 250 MILLIGRAM(S): 400 TABLET, FILM COATED ORAL at 19:12

## 2024-12-02 RX ADMIN — Medication 2 TABLET(S): at 21:42

## 2024-12-03 LAB
GLUCOSE BLDC GLUCOMTR-MCNC: 120 MG/DL — HIGH (ref 70–99)
GLUCOSE BLDC GLUCOMTR-MCNC: 121 MG/DL — HIGH (ref 70–99)
GLUCOSE BLDC GLUCOMTR-MCNC: 137 MG/DL — HIGH (ref 70–99)
GLUCOSE BLDC GLUCOMTR-MCNC: 209 MG/DL — HIGH (ref 70–99)

## 2024-12-03 PROCEDURE — 99232 SBSQ HOSP IP/OBS MODERATE 35: CPT

## 2024-12-03 RX ADMIN — ERYTHROMYCIN 1 APPLICATION(S): 5 OINTMENT OPHTHALMIC at 19:24

## 2024-12-03 RX ADMIN — Medication 1 DROP(S): at 05:41

## 2024-12-03 RX ADMIN — Medication 1 DROP(S): at 19:23

## 2024-12-03 RX ADMIN — CEFTRIAXONE 100 MILLIGRAM(S): 500 INJECTION, POWDER, FOR SOLUTION INTRAMUSCULAR; INTRAVENOUS at 17:16

## 2024-12-03 RX ADMIN — ERYTHROMYCIN 1 APPLICATION(S): 5 OINTMENT OPHTHALMIC at 10:02

## 2024-12-03 RX ADMIN — Medication 1 DROP(S): at 05:02

## 2024-12-03 RX ADMIN — Medication 1 DROP(S): at 14:11

## 2024-12-03 RX ADMIN — Medication 1 APPLICATION(S): at 11:54

## 2024-12-03 RX ADMIN — Medication 1 DROP(S): at 10:01

## 2024-12-03 RX ADMIN — Medication 1 DROP(S): at 05:40

## 2024-12-03 RX ADMIN — Medication 1 DROP(S): at 17:17

## 2024-12-03 RX ADMIN — Medication 1000 MILLIGRAM(S): at 11:52

## 2024-12-03 RX ADMIN — Medication 1 DROP(S): at 11:53

## 2024-12-03 RX ADMIN — Medication 1 DROP(S): at 10:03

## 2024-12-03 RX ADMIN — Medication 1 DROP(S): at 10:04

## 2024-12-03 RX ADMIN — Medication 1 DROP(S): at 17:18

## 2024-12-03 RX ADMIN — HEPARIN SODIUM 5000 UNIT(S): 1000 INJECTION INTRAVENOUS; SUBCUTANEOUS at 14:11

## 2024-12-03 RX ADMIN — MOXIFLOXACIN HYDROCHLORIDE 250 MILLIGRAM(S): 400 TABLET, FILM COATED ORAL at 17:16

## 2024-12-03 RX ADMIN — Medication 650 MILLIGRAM(S): at 21:53

## 2024-12-03 RX ADMIN — HEPARIN SODIUM 5000 UNIT(S): 1000 INJECTION INTRAVENOUS; SUBCUTANEOUS at 21:53

## 2024-12-03 RX ADMIN — LISINOPRIL 40 MILLIGRAM(S): 5 TABLET ORAL at 05:39

## 2024-12-03 RX ADMIN — HEPARIN SODIUM 5000 UNIT(S): 1000 INJECTION INTRAVENOUS; SUBCUTANEOUS at 05:40

## 2024-12-03 RX ADMIN — Medication 1 DROP(S): at 21:54

## 2024-12-03 RX ADMIN — Medication 1 DROP(S): at 11:52

## 2024-12-03 RX ADMIN — Medication 100 MILLIGRAM(S): at 17:16

## 2024-12-03 RX ADMIN — Medication 650 MILLIGRAM(S): at 22:53

## 2024-12-03 RX ADMIN — Medication 100 MILLIGRAM(S): at 05:39

## 2024-12-03 RX ADMIN — CEFTRIAXONE 100 MILLIGRAM(S): 500 INJECTION, POWDER, FOR SOLUTION INTRAMUSCULAR; INTRAVENOUS at 05:39

## 2024-12-04 LAB
GLUCOSE BLDC GLUCOMTR-MCNC: 124 MG/DL — HIGH (ref 70–99)
GLUCOSE BLDC GLUCOMTR-MCNC: 127 MG/DL — HIGH (ref 70–99)
GLUCOSE BLDC GLUCOMTR-MCNC: 145 MG/DL — HIGH (ref 70–99)
GLUCOSE BLDC GLUCOMTR-MCNC: 156 MG/DL — HIGH (ref 70–99)

## 2024-12-04 PROCEDURE — 99233 SBSQ HOSP IP/OBS HIGH 50: CPT | Mod: GC

## 2024-12-04 RX ADMIN — Medication 1 APPLICATION(S): at 12:45

## 2024-12-04 RX ADMIN — Medication 1 DROP(S): at 18:15

## 2024-12-04 RX ADMIN — CEFTRIAXONE 100 MILLIGRAM(S): 500 INJECTION, POWDER, FOR SOLUTION INTRAMUSCULAR; INTRAVENOUS at 06:19

## 2024-12-04 RX ADMIN — Medication 1 DROP(S): at 08:59

## 2024-12-04 RX ADMIN — Medication 1 DROP(S): at 10:38

## 2024-12-04 RX ADMIN — Medication 1 DROP(S): at 06:19

## 2024-12-04 RX ADMIN — ERYTHROMYCIN 1 APPLICATION(S): 5 OINTMENT OPHTHALMIC at 08:59

## 2024-12-04 RX ADMIN — Medication 1 DROP(S): at 13:53

## 2024-12-04 RX ADMIN — LISINOPRIL 40 MILLIGRAM(S): 5 TABLET ORAL at 06:19

## 2024-12-04 RX ADMIN — Medication 1 DROP(S): at 06:20

## 2024-12-04 RX ADMIN — HEPARIN SODIUM 5000 UNIT(S): 1000 INJECTION INTRAVENOUS; SUBCUTANEOUS at 06:20

## 2024-12-04 RX ADMIN — Medication 1 DROP(S): at 16:10

## 2024-12-04 RX ADMIN — Medication 1 DROP(S): at 23:13

## 2024-12-04 RX ADMIN — Medication 100 MILLIGRAM(S): at 18:14

## 2024-12-04 RX ADMIN — Medication 1 DROP(S): at 03:52

## 2024-12-04 RX ADMIN — Medication 1000 MILLIGRAM(S): at 12:43

## 2024-12-04 RX ADMIN — Medication 1 DROP(S): at 23:14

## 2024-12-04 RX ADMIN — Medication 1 DROP(S): at 16:12

## 2024-12-04 RX ADMIN — Medication 1 DROP(S): at 12:44

## 2024-12-04 RX ADMIN — Medication 1 DROP(S): at 12:43

## 2024-12-04 RX ADMIN — Medication 100 MILLIGRAM(S): at 06:19

## 2024-12-04 RX ADMIN — ERYTHROMYCIN 1 APPLICATION(S): 5 OINTMENT OPHTHALMIC at 20:03

## 2024-12-04 RX ADMIN — CEFTRIAXONE 100 MILLIGRAM(S): 500 INJECTION, POWDER, FOR SOLUTION INTRAMUSCULAR; INTRAVENOUS at 18:13

## 2024-12-04 RX ADMIN — MOXIFLOXACIN HYDROCHLORIDE 250 MILLIGRAM(S): 400 TABLET, FILM COATED ORAL at 19:07

## 2024-12-04 RX ADMIN — Medication 1 DROP(S): at 14:19

## 2024-12-04 RX ADMIN — Medication 1 DROP(S): at 20:03

## 2024-12-04 RX ADMIN — Medication 1 DROP(S): at 03:53

## 2024-12-05 DIAGNOSIS — F43.20 ADJUSTMENT DISORDER, UNSPECIFIED: ICD-10-CM

## 2024-12-05 LAB
GLUCOSE BLDC GLUCOMTR-MCNC: 113 MG/DL — HIGH (ref 70–99)
GLUCOSE BLDC GLUCOMTR-MCNC: 113 MG/DL — HIGH (ref 70–99)
GLUCOSE BLDC GLUCOMTR-MCNC: 172 MG/DL — HIGH (ref 70–99)
GLUCOSE BLDC GLUCOMTR-MCNC: 251 MG/DL — HIGH (ref 70–99)

## 2024-12-05 PROCEDURE — 99223 1ST HOSP IP/OBS HIGH 75: CPT

## 2024-12-05 PROCEDURE — 99233 SBSQ HOSP IP/OBS HIGH 50: CPT | Mod: GC

## 2024-12-05 RX ADMIN — MOXIFLOXACIN HYDROCHLORIDE 250 MILLIGRAM(S): 400 TABLET, FILM COATED ORAL at 19:23

## 2024-12-05 RX ADMIN — Medication 1 DROP(S): at 14:14

## 2024-12-05 RX ADMIN — ERYTHROMYCIN 1 APPLICATION(S): 5 OINTMENT OPHTHALMIC at 07:38

## 2024-12-05 RX ADMIN — Medication 1 DROP(S): at 18:46

## 2024-12-05 RX ADMIN — Medication 1 DROP(S): at 17:43

## 2024-12-05 RX ADMIN — Medication 1 DROP(S): at 06:15

## 2024-12-05 RX ADMIN — Medication 100 MILLIGRAM(S): at 18:47

## 2024-12-05 RX ADMIN — Medication 1 DROP(S): at 10:08

## 2024-12-05 RX ADMIN — Medication 1 APPLICATION(S): at 12:41

## 2024-12-05 RX ADMIN — Medication 1 DROP(S): at 12:35

## 2024-12-05 RX ADMIN — INSULIN LISPRO 3: 100 INJECTION, SOLUTION INTRAVENOUS; SUBCUTANEOUS at 18:44

## 2024-12-05 RX ADMIN — HEPARIN SODIUM 5000 UNIT(S): 1000 INJECTION INTRAVENOUS; SUBCUTANEOUS at 06:14

## 2024-12-05 RX ADMIN — Medication 1 DROP(S): at 19:27

## 2024-12-05 RX ADMIN — Medication 1 DROP(S): at 16:42

## 2024-12-05 RX ADMIN — Medication 100 MILLIGRAM(S): at 06:14

## 2024-12-05 RX ADMIN — Medication 1 DROP(S): at 06:16

## 2024-12-05 RX ADMIN — Medication 1000 MILLIGRAM(S): at 12:35

## 2024-12-05 RX ADMIN — Medication 1 DROP(S): at 22:25

## 2024-12-05 RX ADMIN — INSULIN LISPRO 1: 100 INJECTION, SOLUTION INTRAVENOUS; SUBCUTANEOUS at 12:34

## 2024-12-05 RX ADMIN — CEFTRIAXONE 100 MILLIGRAM(S): 500 INJECTION, POWDER, FOR SOLUTION INTRAMUSCULAR; INTRAVENOUS at 06:14

## 2024-12-05 RX ADMIN — LISINOPRIL 40 MILLIGRAM(S): 5 TABLET ORAL at 06:14

## 2024-12-05 RX ADMIN — Medication 1 DROP(S): at 07:38

## 2024-12-05 RX ADMIN — Medication 1 DROP(S): at 12:36

## 2024-12-05 RX ADMIN — Medication 1 DROP(S): at 18:47

## 2024-12-05 RX ADMIN — ERYTHROMYCIN 1 APPLICATION(S): 5 OINTMENT OPHTHALMIC at 19:27

## 2024-12-05 RX ADMIN — CEFTRIAXONE 100 MILLIGRAM(S): 500 INJECTION, POWDER, FOR SOLUTION INTRAMUSCULAR; INTRAVENOUS at 18:45

## 2024-12-06 DIAGNOSIS — F43.21 ADJUSTMENT DISORDER WITH DEPRESSED MOOD: ICD-10-CM

## 2024-12-06 LAB
GLUCOSE BLDC GLUCOMTR-MCNC: 133 MG/DL — HIGH (ref 70–99)
GLUCOSE BLDC GLUCOMTR-MCNC: 141 MG/DL — HIGH (ref 70–99)
GLUCOSE BLDC GLUCOMTR-MCNC: 145 MG/DL — HIGH (ref 70–99)
GLUCOSE BLDC GLUCOMTR-MCNC: 180 MG/DL — HIGH (ref 70–99)

## 2024-12-06 PROCEDURE — 99239 HOSP IP/OBS DSCHRG MGMT >30: CPT

## 2024-12-06 PROCEDURE — 99232 SBSQ HOSP IP/OBS MODERATE 35: CPT

## 2024-12-06 RX ORDER — DOXYCYCLINE HYCLATE 100 MG
2 TABLET ORAL
Qty: 60 | Refills: 1
Start: 2024-12-06 | End: 2025-01-04

## 2024-12-06 RX ORDER — ERYTHROMYCIN 5 MG/G
1 OINTMENT OPHTHALMIC
Qty: 1 | Refills: 1
Start: 2024-12-06 | End: 2025-02-03

## 2024-12-06 RX ORDER — LANOLIN/MINERAL OIL/PETROLATUM
1 OINTMENT (GRAM) OPHTHALMIC (EYE)
Qty: 1 | Refills: 1
Start: 2024-12-06 | End: 2025-02-03

## 2024-12-06 RX ADMIN — Medication 1 DROP(S): at 06:07

## 2024-12-06 RX ADMIN — Medication 1 DROP(S): at 18:18

## 2024-12-06 RX ADMIN — Medication 1 APPLICATION(S): at 12:27

## 2024-12-06 RX ADMIN — ERYTHROMYCIN 1 APPLICATION(S): 5 OINTMENT OPHTHALMIC at 19:53

## 2024-12-06 RX ADMIN — Medication 1 DROP(S): at 13:30

## 2024-12-06 RX ADMIN — Medication 1 DROP(S): at 12:26

## 2024-12-06 RX ADMIN — Medication 100 MILLIGRAM(S): at 06:07

## 2024-12-06 RX ADMIN — Medication 1 DROP(S): at 10:52

## 2024-12-06 RX ADMIN — Medication 1 DROP(S): at 00:09

## 2024-12-06 RX ADMIN — Medication 1 DROP(S): at 16:58

## 2024-12-06 RX ADMIN — Medication 1 DROP(S): at 08:58

## 2024-12-06 RX ADMIN — Medication 1 DROP(S): at 19:53

## 2024-12-06 RX ADMIN — Medication 1 DROP(S): at 10:51

## 2024-12-06 RX ADMIN — Medication 100 MILLIGRAM(S): at 18:16

## 2024-12-06 RX ADMIN — Medication 1 DROP(S): at 12:27

## 2024-12-06 RX ADMIN — Medication 1 DROP(S): at 06:08

## 2024-12-06 RX ADMIN — LISINOPRIL 40 MILLIGRAM(S): 5 TABLET ORAL at 06:07

## 2024-12-06 RX ADMIN — ERYTHROMYCIN 1 APPLICATION(S): 5 OINTMENT OPHTHALMIC at 08:08

## 2024-12-06 RX ADMIN — Medication 1000 MILLIGRAM(S): at 12:24

## 2024-12-06 RX ADMIN — Medication 1 DROP(S): at 22:28

## 2024-12-06 RX ADMIN — Medication 1 DROP(S): at 14:25

## 2024-12-06 RX ADMIN — Medication 1 DROP(S): at 16:10

## 2024-12-07 ENCOUNTER — TRANSCRIPTION ENCOUNTER (OUTPATIENT)
Age: 79
End: 2024-12-07

## 2024-12-07 VITALS
DIASTOLIC BLOOD PRESSURE: 70 MMHG | RESPIRATION RATE: 18 BRPM | HEART RATE: 60 BPM | OXYGEN SATURATION: 99 % | SYSTOLIC BLOOD PRESSURE: 148 MMHG | TEMPERATURE: 99 F

## 2024-12-07 LAB
GLUCOSE BLDC GLUCOMTR-MCNC: 123 MG/DL — HIGH (ref 70–99)
GLUCOSE BLDC GLUCOMTR-MCNC: 152 MG/DL — HIGH (ref 70–99)

## 2024-12-07 PROCEDURE — 99233 SBSQ HOSP IP/OBS HIGH 50: CPT

## 2024-12-07 RX ADMIN — Medication 1 DROP(S): at 12:18

## 2024-12-07 RX ADMIN — Medication 1000 MILLIGRAM(S): at 12:16

## 2024-12-07 RX ADMIN — Medication 1 DROP(S): at 13:49

## 2024-12-07 RX ADMIN — Medication 1 DROP(S): at 06:11

## 2024-12-07 RX ADMIN — INSULIN LISPRO 1: 100 INJECTION, SOLUTION INTRAVENOUS; SUBCUTANEOUS at 09:10

## 2024-12-07 RX ADMIN — Medication 1 DROP(S): at 09:13

## 2024-12-07 RX ADMIN — Medication 1 DROP(S): at 06:12

## 2024-12-07 RX ADMIN — Medication 1 APPLICATION(S): at 12:19

## 2024-12-07 RX ADMIN — LISINOPRIL 40 MILLIGRAM(S): 5 TABLET ORAL at 06:12

## 2024-12-07 RX ADMIN — Medication 100 MILLIGRAM(S): at 06:12

## 2024-12-07 RX ADMIN — Medication 1 DROP(S): at 08:03

## 2024-12-07 RX ADMIN — Medication 1 DROP(S): at 07:32

## 2024-12-07 RX ADMIN — Medication 1 DROP(S): at 00:07

## 2024-12-07 RX ADMIN — Medication 1 DROP(S): at 12:17

## 2024-12-07 RX ADMIN — Medication 1 DROP(S): at 12:58

## 2024-12-07 RX ADMIN — POLYETHYLENE GLYCOL 3350 17 GRAM(S): 17 POWDER, FOR SOLUTION ORAL at 12:16

## 2024-12-07 RX ADMIN — ERYTHROMYCIN 1 APPLICATION(S): 5 OINTMENT OPHTHALMIC at 08:03

## 2024-12-12 PROBLEM — H26.9 UNSPECIFIED CATARACT: Chronic | Status: ACTIVE | Noted: 2024-11-15

## 2024-12-12 PROBLEM — I10 ESSENTIAL (PRIMARY) HYPERTENSION: Chronic | Status: ACTIVE | Noted: 2024-11-14

## 2024-12-24 ENCOUNTER — APPOINTMENT (OUTPATIENT)
Dept: OPHTHALMOLOGY | Facility: CLINIC | Age: 79
End: 2024-12-24
Payer: COMMERCIAL

## 2024-12-24 ENCOUNTER — NON-APPOINTMENT (OUTPATIENT)
Age: 79
End: 2024-12-24

## 2024-12-24 PROCEDURE — 92012 INTRM OPH EXAM EST PATIENT: CPT | Mod: 24

## 2025-02-20 PROBLEM — E11.9 TYPE 2 DIABETES MELLITUS WITHOUT COMPLICATIONS: Chronic | Status: ACTIVE | Noted: 2024-11-14

## 2025-03-25 NOTE — ASU PATIENT PROFILE, ADULT - VISION (WITH CORRECTIVE LENSES IF THE PATIENT USUALLY WEARS THEM):
Partially impaired: cannot see medication labels or newsprint, but can see obstacles in path, and the surrounding layout; can count fingers at arm's length uses eyeglasses for reading/Partially impaired: cannot see medication labels or newsprint, but can see obstacles in path, and the surrounding layout; can count fingers at arm's length

## 2025-03-25 NOTE — ASU PATIENT PROFILE, ADULT - NS PREOP UNDERSTANDS INFO
No solid food for 8 hours before surgery/ no chewing gums or hard candy while fasting/ wear loose fitting comfortable clothes/ no lotions/ perfume/ jewelry or body piercing/ have escort for pickup  once you're recovered/  Take along picture IDand INS card/yes

## 2025-03-25 NOTE — ASU PATIENT PROFILE, ADULT - FALL HARM RISK - RISK INTERVENTIONS

## 2025-03-25 NOTE — ASU PATIENT PROFILE, ADULT - ABILITY TO HEAR (WITH HEARING AID OR HEARING APPLIANCE IF NORMALLY USED):
Adequate: hears normal conversation without difficulty uses right hearing aid/Mildly to Moderately Impaired: difficulty hearing in some environments or speaker may need to increase volume or speak distinctly

## 2025-03-25 NOTE — ASU PATIENT PROFILE, ADULT - NSICDXPASTSURGICALHX_GEN_ALL_CORE_FT
PAST SURGICAL HISTORY:  H/O detached retina repair     S/P      S/P cataract extraction and insertion of intraocular lens     S/P cataract surgery      PAST SURGICAL HISTORY:  Cataract, left eye removed    H/O detached retina repair right eye    S/P  X 2

## 2025-03-26 ENCOUNTER — OUTPATIENT (OUTPATIENT)
Dept: OUTPATIENT SERVICES | Facility: HOSPITAL | Age: 80
LOS: 1 days | Discharge: HOME | End: 2025-03-26
Payer: COMMERCIAL

## 2025-03-26 ENCOUNTER — RESULT REVIEW (OUTPATIENT)
Age: 80
End: 2025-03-26

## 2025-03-26 ENCOUNTER — APPOINTMENT (OUTPATIENT)
Dept: OPHTHALMOLOGY | Facility: AMBULATORY SURGERY CENTER | Age: 80
End: 2025-03-26

## 2025-03-26 VITALS
HEIGHT: 56 IN | OXYGEN SATURATION: 96 % | WEIGHT: 149.69 LBS | TEMPERATURE: 99 F | SYSTOLIC BLOOD PRESSURE: 177 MMHG | RESPIRATION RATE: 16 BRPM | DIASTOLIC BLOOD PRESSURE: 73 MMHG | HEART RATE: 55 BPM

## 2025-03-26 VITALS
RESPIRATION RATE: 16 BRPM | SYSTOLIC BLOOD PRESSURE: 177 MMHG | DIASTOLIC BLOOD PRESSURE: 75 MMHG | HEART RATE: 51 BPM | TEMPERATURE: 98 F | OXYGEN SATURATION: 97 %

## 2025-03-26 DIAGNOSIS — Z98.49 CATARACT EXTRACTION STATUS, UNSPECIFIED EYE: Chronic | ICD-10-CM

## 2025-03-26 DIAGNOSIS — Z98.890 OTHER SPECIFIED POSTPROCEDURAL STATES: Chronic | ICD-10-CM

## 2025-03-26 DIAGNOSIS — Z98.891 HISTORY OF UTERINE SCAR FROM PREVIOUS SURGERY: Chronic | ICD-10-CM

## 2025-03-26 DIAGNOSIS — H26.9 UNSPECIFIED CATARACT: Chronic | ICD-10-CM

## 2025-03-26 PROBLEM — E11.9 TYPE 2 DIABETES MELLITUS WITHOUT COMPLICATIONS: Chronic | Status: INACTIVE | Noted: 2024-11-14 | Resolved: 2025-03-26

## 2025-03-26 PROCEDURE — 88312 SPECIAL STAINS GROUP 1: CPT | Mod: 26

## 2025-03-26 PROCEDURE — 65756 CORNEAL TRNSPL ENDOTHELIAL: CPT | Mod: RT

## 2025-03-26 PROCEDURE — 88304 TISSUE EXAM BY PATHOLOGIST: CPT | Mod: 26

## 2025-03-26 RX ORDER — SODIUM CHLORIDE 9 G/1000ML
500 INJECTION, SOLUTION INTRAVENOUS
Refills: 0 | Status: DISCONTINUED | OUTPATIENT
Start: 2025-03-26 | End: 2025-03-26

## 2025-03-26 RX ORDER — OFLOXACIN 3 MG/ML
1 SOLUTION OPHTHALMIC
Refills: 0 | Status: COMPLETED | OUTPATIENT
Start: 2025-03-26 | End: 2025-03-26

## 2025-03-26 RX ORDER — EMPAGLIFLOZIN 25 MG/1
1 TABLET, FILM COATED ORAL
Refills: 0 | DISCHARGE

## 2025-03-26 RX ORDER — LISINOPRIL 5 MG/1
1 TABLET ORAL
Refills: 0 | DISCHARGE

## 2025-03-26 RX ORDER — METFORMIN HYDROCHLORIDE 850 MG/1
1 TABLET ORAL
Refills: 0 | DISCHARGE

## 2025-03-26 RX ORDER — ONDANSETRON HCL/PF 4 MG/2 ML
4 VIAL (ML) INJECTION ONCE
Refills: 0 | Status: DISCONTINUED | OUTPATIENT
Start: 2025-03-26 | End: 2025-03-26

## 2025-03-26 RX ORDER — METOPROLOL SUCCINATE 50 MG/1
1 TABLET, EXTENDED RELEASE ORAL
Refills: 0 | DISCHARGE

## 2025-03-26 RX ORDER — TETRACAINE HYDROCHLORIDE 5 MG/ML
1 SOLUTION OPHTHALMIC ONCE
Refills: 0 | Status: COMPLETED | OUTPATIENT
Start: 2025-03-26 | End: 2025-03-26

## 2025-03-26 RX ORDER — MONTELUKAST SODIUM 10 MG/1
1 TABLET ORAL
Refills: 0 | DISCHARGE

## 2025-03-26 RX ADMIN — OFLOXACIN 1 DROP(S): 3 SOLUTION OPHTHALMIC at 10:06

## 2025-03-26 RX ADMIN — OFLOXACIN 1 DROP(S): 3 SOLUTION OPHTHALMIC at 10:11

## 2025-03-26 RX ADMIN — TETRACAINE HYDROCHLORIDE 1 DROP(S): 5 SOLUTION OPHTHALMIC at 10:01

## 2025-03-26 RX ADMIN — OFLOXACIN 1 DROP(S): 3 SOLUTION OPHTHALMIC at 10:01

## 2025-03-26 NOTE — PRE-ANESTHESIA EVALUATION ADULT - NSANTHPEFT_GEN_ALL_CORE
Gen: A&Ox4 in NAD with pleasant demeanor  CV: RRR S1/S2 intact  Resp: b/L breath sounds intact, breathing comfortably

## 2025-03-26 NOTE — OPERATIVE REPORT - OPERATIVE RPOSRT DETAILS
SURGEON: Naif Varela MD    ASSISTANT: Shayy Carson MD    PRE-OP DIAGNOSIS: corneal edema, right eye    POST-OP DIAGNOSIS: Same    ANESTHESIA: MAC + topical    PROCEDURE: Descemet Stripping Automated Endothelial Keratoplasty, Right Eye    SPECIMEN/TISSUE REMOVED: Descemets Membrane    ESTIMATED BLOOD LOSS: < 1mL    COMPLICATIONS: None    TISSUE SIZE: 8mm    TISSUE ID NUMBER: [id number]    PROCEDURE:    The patient was seen in the postoperative area.  Risks, benefits, and alternatives of the surgery were discussed.  All questions were answered, and informed consent was obtained.  Correct operative site was identified and marked.  The patient received standard preoperative drops in her right eye.  The patient was then brought to the operating room where she was prepped and draped in the sterile surgical fashion.    Attention was first turned to the donor tissue which was evaluated, marked, and trephinated 8 mm.  The graft was then placed aside, and attention was turned to the operative eye.    The lids were held open using an adjustable Dipesh speculum.  The eye was marked to indicate 8 mm on the cornea and to measure 5 mm where the main wound would be.  Using an MVR blade, a paracentesis was created inferiorly, and an anterior chamber maintainer was then inserted into the eye.  The eye was then tested for the appropriate pressure.  Three paracenteses tracts were created; one in the area of the main wound, one clockwise to that position and one counterclockwise.  Miochol was administered into the anterior chamber. A reverse Sinskey hook was used to score and remove the central 8 mm of previous Descemet membrane graft.  The graft was removed in its entirety.    The central paracentesis site was then enlarged to 5 mm.  The donor cornea was then brought onto the operative field.  Healon was placed on the endothelial surface, and the graft was folded in half.    A CTC-6L needle with 10-0 Prolene was then passed through the stroma of the distal fold of the graft.  Both ends of the needle were then passed through the wound that was created and across the anterior chamber, out through the distal cornea which was marked.  The graft was then inserted using a pull-through technique.  The graft was gently unfolded, and 10-0 nylon sutures were used to close the main wound.  Interrupted sutures were placed until the wound obtained a watertight seal.  The paracentesis tract with the chamber maintainer was also closed with a 10-0 nylon suture.  An air bubble was placed at 100% for approximately 10 minutes.  Dilating drops were applied to the surface of the eye.  After the time had elapsed, the air bubble was then titrated down until it was mobile.    Lid speculum was then removed.  Antibiotic and steroid ophthalmic ointment was then applied to the surface of the eye, and the eye was patched with a shield.    The patient tolerated the procedure well without complications and was transferred to the recovery room in stable condition.  They received standard postoperative instructions and an appointment to follow up in the office the next day.

## 2025-03-27 ENCOUNTER — NON-APPOINTMENT (OUTPATIENT)
Age: 80
End: 2025-03-27

## 2025-03-27 ENCOUNTER — APPOINTMENT (OUTPATIENT)
Dept: OPHTHALMOLOGY | Facility: CLINIC | Age: 80
End: 2025-03-27
Payer: COMMERCIAL

## 2025-03-27 PROBLEM — E11.9 TYPE 2 DIABETES MELLITUS WITHOUT COMPLICATIONS: Chronic | Status: ACTIVE | Noted: 2025-03-26

## 2025-03-27 PROCEDURE — 99024 POSTOP FOLLOW-UP VISIT: CPT

## 2025-03-31 ENCOUNTER — APPOINTMENT (OUTPATIENT)
Dept: OPHTHALMOLOGY | Facility: CLINIC | Age: 80
End: 2025-03-31
Payer: COMMERCIAL

## 2025-03-31 ENCOUNTER — NON-APPOINTMENT (OUTPATIENT)
Age: 80
End: 2025-03-31

## 2025-03-31 PROCEDURE — 99024 POSTOP FOLLOW-UP VISIT: CPT

## 2025-04-09 ENCOUNTER — APPOINTMENT (OUTPATIENT)
Dept: OPHTHALMOLOGY | Facility: CLINIC | Age: 80
End: 2025-04-09
Payer: COMMERCIAL

## 2025-04-09 ENCOUNTER — NON-APPOINTMENT (OUTPATIENT)
Age: 80
End: 2025-04-09

## 2025-04-09 PROCEDURE — 99024 POSTOP FOLLOW-UP VISIT: CPT

## 2025-04-18 ENCOUNTER — APPOINTMENT (OUTPATIENT)
Dept: OPHTHALMOLOGY | Facility: CLINIC | Age: 80
End: 2025-04-18

## 2025-04-22 ENCOUNTER — APPOINTMENT (OUTPATIENT)
Dept: OPHTHALMOLOGY | Facility: CLINIC | Age: 80
End: 2025-04-22

## 2025-05-14 ENCOUNTER — NON-APPOINTMENT (OUTPATIENT)
Age: 80
End: 2025-05-14

## 2025-05-14 ENCOUNTER — APPOINTMENT (OUTPATIENT)
Dept: OPHTHALMOLOGY | Facility: CLINIC | Age: 80
End: 2025-05-14
Payer: COMMERCIAL

## 2025-05-14 PROCEDURE — 99024 POSTOP FOLLOW-UP VISIT: CPT

## 2025-05-15 ENCOUNTER — APPOINTMENT (OUTPATIENT)
Dept: OPHTHALMOLOGY | Facility: HOSPITAL | Age: 80
End: 2025-05-15

## 2025-06-11 ENCOUNTER — APPOINTMENT (OUTPATIENT)
Dept: OPHTHALMOLOGY | Facility: CLINIC | Age: 80
End: 2025-06-11
Payer: COMMERCIAL

## 2025-06-11 ENCOUNTER — NON-APPOINTMENT (OUTPATIENT)
Age: 80
End: 2025-06-11

## 2025-06-11 PROCEDURE — 99024 POSTOP FOLLOW-UP VISIT: CPT

## 2025-07-09 ENCOUNTER — APPOINTMENT (OUTPATIENT)
Dept: OPHTHALMOLOGY | Facility: CLINIC | Age: 80
End: 2025-07-09

## 2025-07-09 ENCOUNTER — NON-APPOINTMENT (OUTPATIENT)
Age: 80
End: 2025-07-09

## 2025-07-09 PROCEDURE — 99024 POSTOP FOLLOW-UP VISIT: CPT

## (undated) DEVICE — SLEEVE INTREPID MICROSMOOTH ULTRA INFUSION 0.9MM (PINK)

## (undated) DEVICE — KNIFE FULL HANDLE ANGLE 2.75MM

## (undated) DEVICE — MARKING PEN DEVON DUAL TIP W RULER

## (undated) DEVICE — SPEAR SURG EYE WECK-CELL CELOS

## (undated) DEVICE — GLV 7.5 PROTEXIS (WHITE)

## (undated) DEVICE — FORCEP GRIESHABER MAXGRIP 25GA DISP

## (undated) DEVICE — DRAPE MICROSCOPE KNOB COVER SMALL (2 PCS)

## (undated) DEVICE — PUNCH CORNEAL BARRON 8MM

## (undated) DEVICE — KNIFE ALCON MVR V-LANCE 20G (WHITE)

## (undated) DEVICE — CENTURION PAK FACO ACTIVE INTREPID FMS 0.9 MM ULTRA

## (undated) DEVICE — Device

## (undated) DEVICE — NDL PERIBULBAR ATKINSON 25G X 7/8"

## (undated) DEVICE — PACK ANTERIOR SEGMENT

## (undated) DEVICE — CANNULA IRR ANT CHAMBER 30G

## (undated) DEVICE — CULTURESWAB WITHOUT CHARCOAL

## (undated) DEVICE — VENODYNE/SCD SLEEVE CALF MEDIUM

## (undated) DEVICE — PETRI DISH MED 3.5"

## (undated) DEVICE — SUT PROLENE 10-0 4" CTC-6L

## (undated) DEVICE — ACM SELF RETAINING 20G

## (undated) DEVICE — GOWN ROYAL SILK XL

## (undated) DEVICE — SUT ETHILON 10-0 12" CS160-6